# Patient Record
Sex: FEMALE | Race: WHITE | NOT HISPANIC OR LATINO
[De-identification: names, ages, dates, MRNs, and addresses within clinical notes are randomized per-mention and may not be internally consistent; named-entity substitution may affect disease eponyms.]

---

## 2020-03-23 ENCOUNTER — APPOINTMENT (OUTPATIENT)
Dept: CARDIOLOGY | Facility: CLINIC | Age: 70
End: 2020-03-23

## 2020-08-23 ENCOUNTER — EMERGENCY (EMERGENCY)
Facility: HOSPITAL | Age: 70
LOS: 1 days | Discharge: HOME | End: 2020-08-23
Attending: EMERGENCY MEDICINE
Payer: MEDICARE

## 2020-08-23 VITALS
DIASTOLIC BLOOD PRESSURE: 83 MMHG | WEIGHT: 203.05 LBS | HEIGHT: 64 IN | SYSTOLIC BLOOD PRESSURE: 190 MMHG | TEMPERATURE: 98 F | RESPIRATION RATE: 17 BRPM | HEART RATE: 74 BPM | OXYGEN SATURATION: 94 %

## 2020-08-23 PROCEDURE — 90715 TDAP VACCINE 7 YRS/> IM: CPT

## 2020-08-23 PROCEDURE — 99284 EMERGENCY DEPT VISIT MOD MDM: CPT

## 2020-08-23 PROCEDURE — 73140 X-RAY EXAM OF FINGER(S): CPT

## 2020-08-23 PROCEDURE — 99284 EMERGENCY DEPT VISIT MOD MDM: CPT | Mod: 25

## 2020-08-23 PROCEDURE — 90471 IMMUNIZATION ADMIN: CPT

## 2020-08-23 PROCEDURE — 73140 X-RAY EXAM OF FINGER(S): CPT | Mod: 26,RT

## 2020-08-23 RX ORDER — TETANUS TOXOID, REDUCED DIPHTHERIA TOXOID AND ACELLULAR PERTUSSIS VACCINE, ADSORBED 5; 2.5; 8; 8; 2.5 [IU]/.5ML; [IU]/.5ML; UG/.5ML; UG/.5ML; UG/.5ML
0.5 SUSPENSION INTRAMUSCULAR ONCE
Refills: 0 | Status: COMPLETED | OUTPATIENT
Start: 2020-08-23 | End: 2020-08-23

## 2020-08-23 RX ORDER — CEPHALEXIN 500 MG
20 CAPSULE ORAL
Qty: 120 | Refills: 0
Start: 2020-08-23 | End: 2020-08-25

## 2020-08-23 RX ORDER — CEPHALEXIN 500 MG
10 CAPSULE ORAL
Qty: 400 | Refills: 0
Start: 2020-08-23 | End: 2020-09-01

## 2020-08-23 RX ORDER — CEPHALEXIN 500 MG
10 CAPSULE ORAL
Qty: 60 | Refills: 0
Start: 2020-08-23 | End: 2020-08-25

## 2020-08-23 RX ORDER — CEPHALEXIN 500 MG
500 CAPSULE ORAL ONCE
Refills: 0 | Status: COMPLETED | OUTPATIENT
Start: 2020-08-23 | End: 2020-08-23

## 2020-08-23 RX ADMIN — TETANUS TOXOID, REDUCED DIPHTHERIA TOXOID AND ACELLULAR PERTUSSIS VACCINE, ADSORBED 0.5 MILLILITER(S): 5; 2.5; 8; 8; 2.5 SUSPENSION INTRAMUSCULAR at 18:57

## 2020-08-23 RX ADMIN — Medication 500 MILLIGRAM(S): at 19:54

## 2020-08-23 NOTE — ED PROVIDER NOTE - PATIENT PORTAL LINK FT
You can access the FollowMyHealth Patient Portal offered by [facility] by registering at the following website: [email or web address]. By joining [employer]’s FollowMyHealth portal, you will also be able to view your health information using other applications (apps) compatible with our system.

## 2020-08-23 NOTE — CONSULT NOTE ADULT - SUBJECTIVE AND OBJECTIVE BOX
ORTHO-HAND SERVICE    Pt Name: RICHELLE GO    MRN: [id number]      Patient is a 70y Female presenting to the emergency department with a chief complaint of right 2nd digit distal phalanx avulsion injury. Pt states that she was using a folding chair when he finger got caught in it after she sat down. Pt admits to numbness at the distal tip where injury occurred Pt otherwise has no other complaints.      Patient presents for evaluation of right 2nd digit distal phalanx avulsion injury.      REVIEW OF SYSTEMS    General: Alert, responsive, in NAD    Skin/Breast: No rashes, no pruritis. +right 2nd digit distal phalanx avulsion  	  Ophthalmologic: No visual changes. No redness.   	  ENMT:	No discharge. No swelling.    Respiratory and Thorax: No difficulty breathing. No cough.  	   Cardiovascular: No chest pain. No palpitations.    Gastrointestinal: No abdominal pain. No diarrhea.     Genitourinary: No dysuria. No bleeding.    Musculoskeletal: SEE HPI.    Neurological: No sensory or motor changes.     Psychiatric: No anxiety or depression.    Hematology/Lymphatics: No swelling.    Endocrine: No Hx of diabetes.    ROS is otherwise negative.    PAST MEDICAL & SURGICAL HISTORY:  PAST MEDICAL & SURGICAL HISTORY:  No pertinent past medical history      Allergies: No Known Allergies      Medications: cephalexin Suspension 50 mG/mL 500 milliGRAM(s) Oral Once      FAMILY HISTORY:  : non-contributory    Social History:       Daily Height in cm: 162.56 (23 Aug 2020 18:17)    Daily                     PHYSICAL EXAM:      Appearance: Alert, responsive, in no acute distress.    Right upper extremity: Sensation is grossly intact to light touch of the entire right 2nd digit with the exception of the distal tip where avulsion is. + avulsion injury of the right 2nd distal phalanx just distal to nail bed with complete avulsion of nail. 2+ distal radial pulse. Cap refill < 2 sec. No signs of venous  insufficiency  or stasis. No extremity ulcerations. No cyanosis. + Flexion/extension of left 2nd digit mcp/pip/dip.      Imaging Studies: All imaging reviewed with Dr. Hays    A/P:  Pt is a  70y Female with right 2nd digit distal phalanx avulsion injury with nail completely avulsed.      PLAN d/w Dr. Hays:   * Wound cleansed with betadine/saline solution and dressed with xeroform/gauze  * No further orthopedic surgical intervention necessary at this time  * f/u in the office with Dr. Hays within 1 week  * Do not wet dressing  * Ortho stable for d/c ORTHO-HAND SERVICE    Pt Name: RICHELLE GO    MRN: [id number]      Patient is a 70y Female presenting to the emergency department with a chief complaint of right 2nd digit distal phalanx avulsion injury. Pt states that she was using a folding chair when he finger got caught in it after she sat down. Pt admits to numbness at the distal tip where injury occurred Pt otherwise has no other complaints.      Patient presents for evaluation of right 2nd digit distal phalanx avulsion injury.      REVIEW OF SYSTEMS    General: Alert, responsive, in NAD    Skin/Breast: No rashes, no pruritis. +right 2nd digit distal phalanx avulsion  	  Ophthalmologic: No visual changes. No redness.   	  ENMT:	No discharge. No swelling.    Respiratory and Thorax: No difficulty breathing. No cough.  	   Cardiovascular: No chest pain. No palpitations.    Gastrointestinal: No abdominal pain. No diarrhea.     Genitourinary: No dysuria. No bleeding.    Musculoskeletal: SEE HPI.    Neurological: No sensory or motor changes.     Psychiatric: No anxiety or depression.    Hematology/Lymphatics: No swelling.    Endocrine: No Hx of diabetes.    ROS is otherwise negative.    PAST MEDICAL & SURGICAL HISTORY:  PAST MEDICAL & SURGICAL HISTORY:  No pertinent past medical history      Allergies: No Known Allergies      Medications: cephalexin Suspension 50 mG/mL 500 milliGRAM(s) Oral Once      FAMILY HISTORY:  : non-contributory    Social History:       Daily Height in cm: 162.56 (23 Aug 2020 18:17)    Daily                     PHYSICAL EXAM:      Appearance: Alert, responsive, in no acute distress.    Right upper extremity: Sensation is grossly intact to light touch of the entire right 2nd digit with the exception of the distal tip where avulsion is. + avulsion injury of the right 2nd distal phalanx just distal to nail bed with complete avulsion of nail. 2+ distal radial pulse. Cap refill < 2 sec. No signs of venous  insufficiency  or stasis. No extremity ulcerations. No cyanosis. + Flexion/extension of left 2nd digit mcp/pip/dip.      Imaging Studies: All imaging reviewed with Dr. Hays    A/P:  Pt is a  70y Female with right 2nd digit distal phalanx avulsion injury with nail completely avulsed.      PLAN d/w Dr. Hays:   * Wound cleansed with betadine/saline solution and dressed with xeroform/gauze  * No further orthopedic surgical intervention necessary at this time  * Keflex 500mg 4 times daily x 10 days for d/c  * f/u in the office with Dr. Hays within 1 week  * Do not wet dressing  * Ortho stable for d/c

## 2020-08-23 NOTE — ED PROVIDER NOTE - CARE PROVIDER_API CALL
Michelle Hays)  Orthopedics  [address]  [place] [address]  Phone: [phone]  Fax: [phone]  Follow Up Time: Urgent

## 2020-08-23 NOTE — ED PROVIDER NOTE - OBJECTIVE STATEMENT
71yo female with no PMH presenting with R traumatic partial finger amputation. Patient states her finger got stuck in folding chair and became detached at PIP. Patient placed fingertip on ice and came to ED. Tetanus not up to date. No other injuries.

## 2020-08-23 NOTE — ED PROVIDER NOTE - PHYSICAL EXAMINATION
Gen: NAD, AOx3  Head: NCAT  Lung: no respiratory distress  CV: Normal perfusion  Abd: soft, NTND  MSK: S/p R 2nd fingertip amputation with exposed bone at PIP, No edema, no visible deformities, full range of motion in all 4 extremities  Neuro: No focal neurologic deficits  Skin: s/p R index fingertip amputation  Psych: normal affect

## 2020-08-23 NOTE — ED PROVIDER NOTE - CLINICAL SUMMARY MEDICAL DECISION MAKING FREE TEXT BOX
Pt with partial fingertip amputation with exposed bone- hand surgery eval, update tetanus, reassess. Lazara Bradford DO

## 2020-08-23 NOTE — ED ADULT TRIAGE NOTE - CHIEF COMPLAINT QUOTE
Partial amputation of right index finger secondary to chair collapsing on finger.  amputated part in container on ice.  bleeding controlled.

## 2020-08-23 NOTE — ED PROVIDER NOTE - NSFOLLOWUPINSTRUCTIONS_ED_ALL_ED_FT
1. Follow up with Dr. Hays as soon as possible. Call on Monday for an appointment.  2.  Return to the Emergency Department for worsening, progressive or any other concerning symptoms.   3.  Complete your entire course of antibiotics as prescribed. Do not stop taking antibiotics even if your symptoms improve.   4.  Please take Motrin 600mg by mouth every 6 hours as needed for pain. Please take this medication with food.   5.  Please take tylenol 650 mg every 4 hours as needed for pain. Please do not exceed more than 4,000mg of Tylenol in a day    Traumatic Finger Amputation  A traumatic finger amputation is when a person loses part or all of a finger because of an accident or injury. This condition is a medical emergency. It needs to be treated right away to prevent more damage to the finger and to save the lost part of the finger, if that is possible.  What are the causes?  This condition usually results from an accident that involves:  A car.Power tools.Factory work.Farm or lawn equipment.What are the signs or symptoms?  Symptoms of this condition include:  Bleeding.Pain.Damage to surrounding tissues, such as bones, muscles, tendons, and skin.How is this diagnosed?  This condition is diagnosed with a physical exam. During the exam, your health care provider will determine how severe the injury is and the best way to treat it. X-rays may be done to check for damage to the surrounding bones and tissues.  How is this treated?  This condition is treated by cleaning the wound thoroughly and with medicines for pain. Additional treatment depends on the type of injury that you have and how bad it is:  If only the tip of your finger was removed, treatment may involve placing a protective bandage (dressing) over the wound and cleaning the wound regularly.If the injury is severe, a portion of skin may be taken from another part of the body (graft) and attached to the wound site until the wound heals.If a large portion of the finger was cut off, treatment may include a surgical procedure to reattach the finger (replantation).Follow these instructions at home:  Medicines     Take over-the-counter and prescription medicines only as told by your health care provider.If you were prescribed an antibiotic medicine, use it as told by your health care provider. Do not stop using the antibiotic even if your condition improves.Do not drive or use heavy machinery while taking prescription pain medicine.Wound care     Follow instructions from your health care provider about how to take care of your wound. Make sure you:  Wash your hands with soap and water before you change your dressing. If soap and water are not available, use hand .Change your dressing as told by your health care provider.Leave stitches (sutures), skin glue, or adhesive strips in place. These skin closures may need to stay in place for 2 weeks or longer. If adhesive strip edges start to loosen and curl up, you may trim the loose edges. Do not remove adhesive strips completely unless your health care provider tells you to do that.Check your wound every day for signs of infection. Check for:  Redness, swelling, or pain.Fluid or blood.Warmth.Pus or a bad smell.General instructions     Do exercises to strengthen your finger and hand as directed by your health care provider.Keep your hand raised above the level of your heart when resting.Contact a health care provider if:  Your wound does not seem to be healing well.You have redness, swelling, or pain around your wound.You have fluid or blood coming from your wound.Your wound feels warm to the touch.You have pus or a bad smell coming from your wound.You have a fever.Get help right away if:  You have redness spreading or extending from your wound.Summary  A traumatic finger amputation is when a person loses part or all of a finger because of an accident or injury.This condition is diagnosed with a physical exam. During the exam your health care provider will determine how severe the injury is and the best way to treat it.Follow instructions from your health care provider about how to take care of your wound.This information is not intended to replace advice given to you by your health care provider. Make sure you discuss any questions you have with your health care provider.

## 2020-08-24 ENCOUNTER — APPOINTMENT (OUTPATIENT)
Dept: ORTHOPEDIC SURGERY | Facility: CLINIC | Age: 70
End: 2020-08-24
Payer: MEDICARE

## 2020-08-24 VITALS — TEMPERATURE: 97.6 F

## 2020-08-24 DIAGNOSIS — Z78.9 OTHER SPECIFIED HEALTH STATUS: ICD-10-CM

## 2020-08-24 PROCEDURE — 29130 APPL FINGER SPLINT STATIC: CPT | Mod: F6

## 2020-08-24 PROCEDURE — 99204 OFFICE O/P NEW MOD 45 MIN: CPT | Mod: 25

## 2020-08-27 ENCOUNTER — APPOINTMENT (OUTPATIENT)
Dept: ORTHOPEDIC SURGERY | Facility: CLINIC | Age: 70
End: 2020-08-27
Payer: MEDICARE

## 2020-08-27 VITALS
SYSTOLIC BLOOD PRESSURE: 170 MMHG | HEIGHT: 61 IN | OXYGEN SATURATION: 98 % | DIASTOLIC BLOOD PRESSURE: 79 MMHG | HEART RATE: 62 BPM | WEIGHT: 205 LBS | BODY MASS INDEX: 38.71 KG/M2

## 2020-08-27 PROCEDURE — 29130 APPL FINGER SPLINT STATIC: CPT | Mod: F6

## 2020-08-27 PROCEDURE — 99214 OFFICE O/P EST MOD 30 MIN: CPT | Mod: 25

## 2020-08-30 PROBLEM — Z78.9 CURRENT NON-SMOKER: Status: ACTIVE | Noted: 2020-08-30

## 2020-08-30 PROBLEM — Z78.9 DOES NOT USE ILLICIT DRUGS: Status: ACTIVE | Noted: 2020-08-30

## 2020-08-30 PROBLEM — Z78.9 DENIES ALCOHOL CONSUMPTION: Status: ACTIVE | Noted: 2020-08-30

## 2020-08-31 ENCOUNTER — APPOINTMENT (OUTPATIENT)
Dept: ORTHOPEDIC SURGERY | Facility: CLINIC | Age: 70
End: 2020-08-31
Payer: MEDICARE

## 2020-08-31 PROCEDURE — 99213 OFFICE O/P EST LOW 20 MIN: CPT | Mod: 25

## 2020-08-31 PROCEDURE — 29130 APPL FINGER SPLINT STATIC: CPT | Mod: F6

## 2020-09-03 ENCOUNTER — APPOINTMENT (OUTPATIENT)
Dept: ORTHOPEDIC SURGERY | Facility: CLINIC | Age: 70
End: 2020-09-03
Payer: MEDICARE

## 2020-09-03 PROCEDURE — 99213 OFFICE O/P EST LOW 20 MIN: CPT | Mod: 25

## 2020-09-03 PROCEDURE — 29130 APPL FINGER SPLINT STATIC: CPT | Mod: F6

## 2020-09-03 NOTE — HISTORY OF PRESENT ILLNESS
[Right] : right hand dominant [FreeTextEntry1] : 09/03/2020\par Ms. 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  She is doing well.  Denies fevers or chills, denies increased pain, denies purulent drainage.  She is finishing up her oral abx.\par \par 08/31/2020\par Ms. 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  She tolerated the splint and dressing well.  Denies fevers or chills, denies increased pain, denies purulent drainage.\par \par 08/27/2020\par Ms. 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  She tolerated the splint and dressing well.  Denies fevers or chills, denies increased pain, denies purulent drainage.  She noted some increased swelling in the digit and that the splint became a bit tight.\par \par 08/24/2020\par Ms. RICHELLE GO is a pleasant 70 year old female, RHD, retired high .\par \par She presents to the office with her daughter for evaluation of right index finger injury 8/23/2020.  She was seen at [facility] emergency room where imaging showed distal phalanx fracture.  She was placed in a soft and referred to our office for follow up.\par Denies fevers or chills, denies purulent wound drainage.\par \par She denies prior injury.\par Currently her pain is intermittent, throbbing, without radiation.\par She has paresthesia in the tip of the digit\par She denies night symptoms.\par Symptoms improve with elevation.\par Symptoms worsen with direct pressure.\par \par She is not diabetic.\par She denies history of thyroid disease.\par She denies current tobacco use.\par She denies recreational drug use.\par She does not take any narcotic pain medication. \par

## 2020-09-03 NOTE — PROCEDURE
[FreeTextEntry1] : Wound Care and Finger Splint Application\par \par With patient's verbal consent, the right index finger was cleaned with peroxide to remove dried blood stain.  The skin was dried with gauze, and two Tegaderm dressings were applied to sandwich the digit in between and seal the wound distally.  A stax splint was reapplied to the digit to keep PIP free for flexion.  She tolerated it well.  Splint instruction explained and she expressed understanding.

## 2020-09-03 NOTE — DISCUSSION/SUMMARY
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 (DOI 8/23/2020), doing well\par \par -We discussed in detail the clinical findings\par -We discussed the pathophysiology and natural history of patient's condition\par -I recommended to continue semi occlusive dressing to promote granulation and healing of her injury, and finger splinting, she is NWB\par -We discussed signs of worsening infection and when to go to the hospital emergency room for escalation of care \par -She was advised to keep the UE elevated and use ice intermittently to help decrease swelling \par -She was advised to take over the counter medication for pain as needed if there is no contraindication.\par -I will see her in one week for reevaluation, sooner as needed\par -Patient had the opportunity to ask questions and was in agreement with the plan\par -Over 50% of the time spent with the patient was on counseling the patient on the above diagnosis, treatment plan and prognosis.

## 2020-09-03 NOTE — PHYSICAL EXAM
[de-identified] : GENERAL: Awake, alert, cooperative, answers questions appropriately. No acute distress.  Ambulates independently with a normal gait.\par SKIN: Warm, dry, intact. Color and turgor normal. \par LUNGS: Demonstrates unlabored breathing on room air with no accessory muscle use.\par EXTREMITIES: Warm and well-perfused. \par NEUROLOGICAL: Grossly intact.\par \par FOCUSED UPPER EXTREMITY EXAM:\par \par right index finger dressing removed:\par -distal tip amputation involving full avulsion of the nail, wound clean with blood clot, without any erythema or streaking, no signs of infection; superficial skin white coloration due to moisture; ecchymosis in distal phalanx; mild swelling in finger; normal finger cascade without malrotation\par -mild tenderness to palpation over distal phalanx of the digit\par -no tenderness to palpation over the A1 pulleys\par -almost able to make full fist, free AROM in all fingers except for index with stiffness, pain, and swelling, no scissoring\par -index finger FDS/FDP/extension intact\par -full wrist ROM; full forearm supination/pronation; no ECU subluxation; DRUJ stable and nontender\par -sensation intact in radial, ulnar, and median nerve distributions\par -Brisk capillary refill, digit warm well perfused \par  [de-identified] : No new XRs were taken during today's visit.\par \par Previous XRs of right index finger (3 views) from 8/23/2020 showed traumatic amputation of the tip with tuft fracture.

## 2020-09-10 ENCOUNTER — APPOINTMENT (OUTPATIENT)
Dept: ORTHOPEDIC SURGERY | Facility: CLINIC | Age: 70
End: 2020-09-10
Payer: MEDICARE

## 2020-09-10 VITALS — HEIGHT: 61 IN | BODY MASS INDEX: 38.71 KG/M2 | WEIGHT: 205 LBS

## 2020-09-10 PROCEDURE — 99213 OFFICE O/P EST LOW 20 MIN: CPT

## 2020-09-12 ENCOUNTER — EMERGENCY (EMERGENCY)
Facility: HOSPITAL | Age: 70
LOS: 1 days | Discharge: HOME | End: 2020-09-12
Attending: EMERGENCY MEDICINE
Payer: MEDICARE

## 2020-09-12 VITALS
SYSTOLIC BLOOD PRESSURE: 162 MMHG | RESPIRATION RATE: 16 BRPM | HEIGHT: 64 IN | OXYGEN SATURATION: 98 % | DIASTOLIC BLOOD PRESSURE: 71 MMHG | HEART RATE: 78 BPM | WEIGHT: 199.96 LBS | TEMPERATURE: 99 F

## 2020-09-12 VITALS
DIASTOLIC BLOOD PRESSURE: 72 MMHG | RESPIRATION RATE: 19 BRPM | OXYGEN SATURATION: 99 % | SYSTOLIC BLOOD PRESSURE: 174 MMHG | HEART RATE: 82 BPM

## 2020-09-12 LAB
ALBUMIN SERPL ELPH-MCNC: 4 G/DL — SIGNIFICANT CHANGE UP (ref 3.3–5.2)
ALP SERPL-CCNC: 80 U/L — SIGNIFICANT CHANGE UP (ref 40–120)
ALT FLD-CCNC: 20 U/L — SIGNIFICANT CHANGE UP
ANION GAP SERPL CALC-SCNC: 11 MMOL/L — SIGNIFICANT CHANGE UP (ref 5–17)
APPEARANCE UR: CLEAR — SIGNIFICANT CHANGE UP
AST SERPL-CCNC: 29 U/L — SIGNIFICANT CHANGE UP
BASOPHILS # BLD AUTO: 0.04 K/UL — SIGNIFICANT CHANGE UP (ref 0–0.2)
BASOPHILS NFR BLD AUTO: 0.5 % — SIGNIFICANT CHANGE UP (ref 0–2)
BILIRUB SERPL-MCNC: 0.3 MG/DL — LOW (ref 0.4–2)
BILIRUB UR-MCNC: NEGATIVE — SIGNIFICANT CHANGE UP
BUN SERPL-MCNC: 11 MG/DL — SIGNIFICANT CHANGE UP (ref 8–20)
CALCIUM SERPL-MCNC: 9.1 MG/DL — SIGNIFICANT CHANGE UP (ref 8.6–10.2)
CHLORIDE SERPL-SCNC: 104 MMOL/L — SIGNIFICANT CHANGE UP (ref 98–107)
CO2 SERPL-SCNC: 24 MMOL/L — SIGNIFICANT CHANGE UP (ref 22–29)
COLOR SPEC: YELLOW — SIGNIFICANT CHANGE UP
CREAT SERPL-MCNC: 0.72 MG/DL — SIGNIFICANT CHANGE UP (ref 0.5–1.3)
DIFF PNL FLD: NEGATIVE — SIGNIFICANT CHANGE UP
EOSINOPHIL # BLD AUTO: 0.09 K/UL — SIGNIFICANT CHANGE UP (ref 0–0.5)
EOSINOPHIL NFR BLD AUTO: 1 % — SIGNIFICANT CHANGE UP (ref 0–6)
GLUCOSE SERPL-MCNC: 97 MG/DL — SIGNIFICANT CHANGE UP (ref 70–99)
GLUCOSE UR QL: NEGATIVE MG/DL — SIGNIFICANT CHANGE UP
HCT VFR BLD CALC: 43.9 % — SIGNIFICANT CHANGE UP (ref 34.5–45)
HGB BLD-MCNC: 14.6 G/DL — SIGNIFICANT CHANGE UP (ref 11.5–15.5)
IMM GRANULOCYTES NFR BLD AUTO: 0.2 % — SIGNIFICANT CHANGE UP (ref 0–1.5)
KETONES UR-MCNC: ABNORMAL
LEUKOCYTE ESTERASE UR-ACNC: NEGATIVE — SIGNIFICANT CHANGE UP
LYMPHOCYTES # BLD AUTO: 2.34 K/UL — SIGNIFICANT CHANGE UP (ref 1–3.3)
LYMPHOCYTES # BLD AUTO: 26.4 % — SIGNIFICANT CHANGE UP (ref 13–44)
MCHC RBC-ENTMCNC: 31.9 PG — SIGNIFICANT CHANGE UP (ref 27–34)
MCHC RBC-ENTMCNC: 33.3 GM/DL — SIGNIFICANT CHANGE UP (ref 32–36)
MCV RBC AUTO: 95.9 FL — SIGNIFICANT CHANGE UP (ref 80–100)
MONOCYTES # BLD AUTO: 0.6 K/UL — SIGNIFICANT CHANGE UP (ref 0–0.9)
MONOCYTES NFR BLD AUTO: 6.8 % — SIGNIFICANT CHANGE UP (ref 2–14)
NEUTROPHILS # BLD AUTO: 5.76 K/UL — SIGNIFICANT CHANGE UP (ref 1.8–7.4)
NEUTROPHILS NFR BLD AUTO: 65.1 % — SIGNIFICANT CHANGE UP (ref 43–77)
NITRITE UR-MCNC: NEGATIVE — SIGNIFICANT CHANGE UP
PH UR: 5 — SIGNIFICANT CHANGE UP (ref 5–8)
PLATELET # BLD AUTO: 291 K/UL — SIGNIFICANT CHANGE UP (ref 150–400)
POTASSIUM SERPL-MCNC: 3.8 MMOL/L — SIGNIFICANT CHANGE UP (ref 3.5–5.3)
POTASSIUM SERPL-SCNC: 3.8 MMOL/L — SIGNIFICANT CHANGE UP (ref 3.5–5.3)
PROT SERPL-MCNC: 7.2 G/DL — SIGNIFICANT CHANGE UP (ref 6.6–8.7)
PROT UR-MCNC: NEGATIVE MG/DL — SIGNIFICANT CHANGE UP
RBC # BLD: 4.58 M/UL — SIGNIFICANT CHANGE UP (ref 3.8–5.2)
RBC # FLD: 12.4 % — SIGNIFICANT CHANGE UP (ref 10.3–14.5)
SODIUM SERPL-SCNC: 139 MMOL/L — SIGNIFICANT CHANGE UP (ref 135–145)
SP GR SPEC: 1.02 — SIGNIFICANT CHANGE UP (ref 1.01–1.02)
T3 SERPL-MCNC: 118 NG/DL — SIGNIFICANT CHANGE UP (ref 80–200)
T4 AB SER-ACNC: 8.2 UG/DL — SIGNIFICANT CHANGE UP (ref 4.5–12)
TROPONIN T SERPL-MCNC: <0.01 NG/ML — SIGNIFICANT CHANGE UP (ref 0–0.06)
TSH SERPL-MCNC: 2.31 UIU/ML — SIGNIFICANT CHANGE UP (ref 0.27–4.2)
UROBILINOGEN FLD QL: NEGATIVE MG/DL — SIGNIFICANT CHANGE UP
WBC # BLD: 8.85 K/UL — SIGNIFICANT CHANGE UP (ref 3.8–10.5)
WBC # FLD AUTO: 8.85 K/UL — SIGNIFICANT CHANGE UP (ref 3.8–10.5)

## 2020-09-12 PROCEDURE — 87086 URINE CULTURE/COLONY COUNT: CPT

## 2020-09-12 PROCEDURE — 36415 COLL VENOUS BLD VENIPUNCTURE: CPT

## 2020-09-12 PROCEDURE — 70450 CT HEAD/BRAIN W/O DYE: CPT

## 2020-09-12 PROCEDURE — 84484 ASSAY OF TROPONIN QUANT: CPT

## 2020-09-12 PROCEDURE — 84480 ASSAY TRIIODOTHYRONINE (T3): CPT

## 2020-09-12 PROCEDURE — 81003 URINALYSIS AUTO W/O SCOPE: CPT

## 2020-09-12 PROCEDURE — 99284 EMERGENCY DEPT VISIT MOD MDM: CPT | Mod: 25

## 2020-09-12 PROCEDURE — 99285 EMERGENCY DEPT VISIT HI MDM: CPT

## 2020-09-12 PROCEDURE — 80053 COMPREHEN METABOLIC PANEL: CPT

## 2020-09-12 PROCEDURE — 85025 COMPLETE CBC W/AUTO DIFF WBC: CPT

## 2020-09-12 PROCEDURE — 71045 X-RAY EXAM CHEST 1 VIEW: CPT | Mod: 26

## 2020-09-12 PROCEDURE — 93005 ELECTROCARDIOGRAM TRACING: CPT

## 2020-09-12 PROCEDURE — 70450 CT HEAD/BRAIN W/O DYE: CPT | Mod: 26

## 2020-09-12 PROCEDURE — 84443 ASSAY THYROID STIM HORMONE: CPT

## 2020-09-12 PROCEDURE — 71045 X-RAY EXAM CHEST 1 VIEW: CPT

## 2020-09-12 PROCEDURE — 93010 ELECTROCARDIOGRAM REPORT: CPT

## 2020-09-12 PROCEDURE — 84436 ASSAY OF TOTAL THYROXINE: CPT

## 2020-09-12 RX ORDER — AMLODIPINE BESYLATE 2.5 MG/1
5 TABLET ORAL ONCE
Refills: 0 | Status: COMPLETED | OUTPATIENT
Start: 2020-09-12 | End: 2020-09-12

## 2020-09-12 RX ORDER — AMLODIPINE BESYLATE 2.5 MG/1
1 TABLET ORAL
Qty: 60 | Refills: 0
Start: 2020-09-12 | End: 2020-10-11

## 2020-09-12 RX ADMIN — AMLODIPINE BESYLATE 5 MILLIGRAM(S): 2.5 TABLET ORAL at 20:38

## 2020-09-12 NOTE — ED PROVIDER NOTE - ATTENDING CONTRIBUTION TO CARE
I, Abhinav Junior, have personally performed the HPI, physical exam and medical decision making and was personally present and verified all student documentation or findings including history, physical exam, and medical decision making except as noted.    71 yo F no pmh but hasn't followed up with a PMD in "years", recent right 2nd digit amputation on keflex, p/w intermittent weakness. Patient report after getting out of the bathroom and felt a "disconnect" of her body and as if she was "walking in water". The episode lasted about 5 min. She had another reoccurent episode today. No syncope. no chest pain, no sob. She was found to have HTN but not on any medication.     VITAL SIGNS: I have reviewed nursing notes and confirm.  CONSTITUTIONAL: Well-developed; well-nourished; in no acute distress.  SKIN: Skin exam is warm and dry, no acute rash.  HEAD: Normocephalic; atraumatic.  EYES: PERRL, EOM intact; conjunctiva and sclera clear.  ENT: No nasal discharge; airway clear. Throat clear.  NECK: Supple; non tender.    CARD: S1, S2 normal; Regular rate and rhythm.  RESP: No wheezes,  no rales or rhonchi.   ABD:  soft; non-distended; non-tender;   EXT: Normal ROM. No clubbing, cyanosis or edema.  NEURO: Alert, oriented. Grossly unremarkable. No focal deficits. no facial droop, moves all extremities, finger to nose wnl. no pronator drift.  normal gait   PSYCH: Cooperative, appropriate.      CT head negative. blood work wnl. trop negative. cxr clear. ekg without ischemic changes.   Patient offered Obs for MRI. low suspicion for TIA. patient prefer going home with out patient follow up.

## 2020-09-12 NOTE — ED PROVIDER NOTE - PATIENT PORTAL LINK FT
You can access the FollowMyHealth Patient Portal offered by [facility] by registering at the following website: [email or web address]. By joining [employer]’s FollowMyHealth portal, you will also be able to view your health information using other applications (apps) compatible with our system.

## 2020-09-12 NOTE — ED PROVIDER NOTE - CARE PROVIDER_API CALL
Yemi Perez  CARDIOVASCULAR DISEASE  [address], [place] [address]  Phone: [phone]  Fax: [phone]  Follow Up Time: 7-10 Days

## 2020-09-12 NOTE — ED ADULT TRIAGE NOTE - CHIEF COMPLAINT QUOTE
Pt with R 2nd digit avulsion on August 23rd and just finished her abx. Pt states she has been feeling anxious about it since. Wednesday night pt states she had an episode where she states "it felt like I was moving my legs underwater". Pt then states it subsided and she again had another episode of anxiety on Thursday and Friday experiencing similar symptoms. Pt now states today around 15:45 pt states "my legs felt rubbery and I had a weird full body sensation". Pt with no focal deficits and is noted to have clear speech and able to follow commands.

## 2020-09-12 NOTE — ED PROVIDER NOTE - CLINICAL SUMMARY MEDICAL DECISION MAKING FREE TEXT BOX
70F with no diagnosed med conditions b/c does not see doctors p/w intermittent LE weakness x 3 days. Recent 10 days of Keflex for finger amputation 3 weeks ago. Neuro exam unremarkable. Concern for TIA - will get head CT. Also concern for UTI vs electrolyte imbalance vs hypothyroidism vs DM - will get basic labs, TSH, UA.

## 2020-09-12 NOTE — ED PROVIDER NOTE - OBJECTIVE STATEMENT
70F with no known pmhx p/w intermittent weakness x 3 days. Pt states that on 9/9 she went to the bathroom and then was getting into bed when she felt like both of her "legs were  from her body", difficulty walking/imbalance. Another episode today, also reports anxiety. No falls or syncope. Denies CP, SOB, HA, blurry vision, n/v, d/c, f/c. Pt was here on 8/23 for R 2nd finger partial amputation, reattached by ortho, pt finished 10-day course of Keflex 500mg 4x/day - she was asx on abx. Pt does not see doctors or take meds because her father was overprescribed meds.

## 2020-09-12 NOTE — ED PROVIDER NOTE - PROGRESS NOTE DETAILS
: blood work reviewed with patient and daughter. Patient report feeling scared. Patient lives with  who can watch her. Patient ambulated in the ED without any difficulty. BP remain high however asymstomatic. no chest pain. no sob. I offered Obs for MRI. after long discussion, patient would rather go home and will find a PMD for follow up. Will discharge with Norvasc for BP. Instructed to measure BP at home.

## 2020-09-13 LAB
CULTURE RESULTS: SIGNIFICANT CHANGE UP
SPECIMEN SOURCE: SIGNIFICANT CHANGE UP

## 2020-09-16 ENCOUNTER — APPOINTMENT (OUTPATIENT)
Dept: ORTHOPEDIC SURGERY | Facility: CLINIC | Age: 70
End: 2020-09-16
Payer: MEDICARE

## 2020-09-16 VITALS
BODY MASS INDEX: 38.71 KG/M2 | DIASTOLIC BLOOD PRESSURE: 77 MMHG | SYSTOLIC BLOOD PRESSURE: 144 MMHG | HEART RATE: 63 BPM | HEIGHT: 61 IN | WEIGHT: 205 LBS

## 2020-09-16 PROBLEM — I10 ESSENTIAL (PRIMARY) HYPERTENSION: Chronic | Status: ACTIVE | Noted: 2020-09-12

## 2020-09-16 PROCEDURE — 99213 OFFICE O/P EST LOW 20 MIN: CPT

## 2020-09-17 ENCOUNTER — EMERGENCY (EMERGENCY)
Facility: HOSPITAL | Age: 70
LOS: 1 days | Discharge: HOME | End: 2020-09-17
Attending: EMERGENCY MEDICINE
Payer: MEDICARE

## 2020-09-17 VITALS
DIASTOLIC BLOOD PRESSURE: 68 MMHG | SYSTOLIC BLOOD PRESSURE: 154 MMHG | HEART RATE: 69 BPM | RESPIRATION RATE: 18 BRPM | OXYGEN SATURATION: 96 %

## 2020-09-17 VITALS
DIASTOLIC BLOOD PRESSURE: 82 MMHG | RESPIRATION RATE: 18 BRPM | OXYGEN SATURATION: 95 % | TEMPERATURE: 98 F | SYSTOLIC BLOOD PRESSURE: 155 MMHG | HEART RATE: 65 BPM

## 2020-09-17 LAB
BACTERIA # UR AUTO: NEGATIVE — SIGNIFICANT CHANGE UP
EPI CELLS # UR: SIGNIFICANT CHANGE UP
RBC CASTS # UR COMP ASSIST: SIGNIFICANT CHANGE UP /HPF (ref 0–4)
SARS-COV-2 RNA SPEC QL NAA+PROBE: SIGNIFICANT CHANGE UP
WBC UR QL: SIGNIFICANT CHANGE UP

## 2020-09-17 PROCEDURE — 70551 MRI BRAIN STEM W/O DYE: CPT

## 2020-09-17 PROCEDURE — 36415 COLL VENOUS BLD VENIPUNCTURE: CPT

## 2020-09-17 PROCEDURE — 99284 EMERGENCY DEPT VISIT MOD MDM: CPT | Mod: 25

## 2020-09-17 PROCEDURE — 93880 EXTRACRANIAL BILAT STUDY: CPT | Mod: 26

## 2020-09-17 PROCEDURE — 70450 CT HEAD/BRAIN W/O DYE: CPT | Mod: 26

## 2020-09-17 PROCEDURE — 85025 COMPLETE CBC W/AUTO DIFF WBC: CPT

## 2020-09-17 PROCEDURE — 85730 THROMBOPLASTIN TIME PARTIAL: CPT

## 2020-09-17 PROCEDURE — 70450 CT HEAD/BRAIN W/O DYE: CPT

## 2020-09-17 PROCEDURE — 87086 URINE CULTURE/COLONY COUNT: CPT

## 2020-09-17 PROCEDURE — G0378: CPT

## 2020-09-17 PROCEDURE — 80053 COMPREHEN METABOLIC PANEL: CPT

## 2020-09-17 PROCEDURE — 93880 EXTRACRANIAL BILAT STUDY: CPT

## 2020-09-17 PROCEDURE — 85610 PROTHROMBIN TIME: CPT

## 2020-09-17 PROCEDURE — 70551 MRI BRAIN STEM W/O DYE: CPT | Mod: 26

## 2020-09-17 PROCEDURE — 99218: CPT

## 2020-09-17 PROCEDURE — 81001 URINALYSIS AUTO W/SCOPE: CPT

## 2020-09-17 PROCEDURE — U0003: CPT

## 2020-09-17 PROCEDURE — 84484 ASSAY OF TROPONIN QUANT: CPT

## 2020-09-17 RX ORDER — AMLODIPINE BESYLATE 2.5 MG/1
5 TABLET ORAL DAILY
Refills: 0 | Status: DISCONTINUED | OUTPATIENT
Start: 2020-09-17 | End: 2020-09-17

## 2020-09-17 RX ORDER — AMLODIPINE BESYLATE 2.5 MG/1
5 TABLET ORAL
Refills: 0 | Status: DISCONTINUED | OUTPATIENT
Start: 2020-09-17 | End: 2020-09-22

## 2020-09-17 RX ORDER — AMLODIPINE BESYLATE 2.5 MG/1
5 TABLET ORAL AT BEDTIME
Refills: 0 | Status: DISCONTINUED | OUTPATIENT
Start: 2020-09-17 | End: 2020-09-17

## 2020-09-17 NOTE — ED PROVIDER NOTE - ATTENDING CONTRIBUTION TO CARE
I personally saw the patient with the resident, and completed the key components of the history and physical exam. I then discussed the management plan with the resident.      71 yo female pmh htn , multiple visits to ed  comes to ed with intermittent numbness in left arm numbness x 8 days; last episode occurred yesterday lasting 3 minutes;  denies headache, nausea or vomiting;  pe awake alert in nad heent ncat neck supple cor s1 s2 lung clear abd soft nontender neuro aao   x3 ; 5/5 motor ; sensation intact;   dx paresthesia;  work up stroke ;

## 2020-09-17 NOTE — ED PROVIDER NOTE - OBJECTIVE STATEMENT
Pt is a 70 y.o. F presenting with numbness yesterday. The pt has had intermittent numbness since 8 days ago, on 09/09, 09/12, and yesterday night. Yesterday night the pt had numbness of the entire LUE for three minutes. No weakness, headache, fever, sweats, chills, chest pain, shortness of breath, nausea, vomiting, dysuria, or frequency.

## 2020-09-17 NOTE — ED PROVIDER NOTE - PROGRESS NOTE DETAILS
Anastasiya Villalobos, resident: explained findings to patient, pt to be placed in observation for pending MRI and carotid duplex.

## 2020-09-17 NOTE — ED ADULT NURSE REASSESSMENT NOTE - GENERAL PATIENT STATE
family/SO at bedside/comfortable appearance/resting/sleeping/cooperative
comfortable appearance/cooperative

## 2020-09-17 NOTE — ED CDU PROVIDER INITIAL DAY NOTE - PROGRESS NOTE DETAILS
Pt received from Dr. Russell and resident Dr. Villalobos. Pt evaluated at bedside, Pt is 70 F with HTN presenting with episodes of intermittent numbness. Pt also s/p partial finger amputation in August 2020 on right 2nd digit, followed up with Dr. Hays yesterday and reports wound is healing well. Pt refusing to allow me to examine finger or take dressing down.   PE: AOx3, well appearing, non toxic, MMM. PERRL. Neck supple. +S1/S2, peripheral pulse 2+ b/l. Lungs CTA b/l. Abd soft NTND no rebound/guarding no CVAT. Moves all extremities spontaneously/symmetrically. Right 2nd digit in large dressing. Sensation intact. Ambulates steady gait no drift. No dysmetria. CN II-XII grossly intact. Strength 5/5.   Will continue with observation monitoring and plan.

## 2020-09-17 NOTE — ED CDU PROVIDER DISPOSITION NOTE - ATTENDING CONTRIBUTION TO CARE
Trena: I performed a face to face bedside interview with patient regarding history of present illness, review of symptoms and past medical history. I completed an independent physical exam.  I have discussed patient's plan of care with advanced care provider.   I agree with note as stated above including HISTORY OF PRESENT ILLNESS, HIV, PAST MEDICAL/SURGICAL/FAMILY/SOCIAL HISTORY, ALLERGIES AND HOME MEDICATIONS, REVIEW OF SYSTEMS, PHYSICAL EXAM, MEDICAL DECISION MAKING and any PROGRESS NOTES during the time I functioned as the attending physician for this patient  unless otherwise noted. My brief assessment is as follows: Patient with intermittent numbness x 8 days. Last episode yesterday. Placed in CDU for MRI. MRI negative, cleared for discharge.

## 2020-09-17 NOTE — ED CDU PROVIDER DISPOSITION NOTE - CLINICAL COURSE
70 y.o. F presenting with numbness yesterday. The pt has had intermittent numbness since 8 days ago, on 09/09, 09/12, and yesterday night. Yesterday night the pt had numbness of the entire LUE for three minutes. ED workup unremarkable. Pt put into obs for MR head. MR head negative for cva, no acute findings. Pt to be discharged with outpt neuro f/u. Pt well appearing, neuro intact, NAD. Given copy of all results and return precautions. Stable for c

## 2020-09-17 NOTE — ED CDU PROVIDER DISPOSITION NOTE - PATIENT PORTAL LINK FT
You can access the FollowMyHealth Patient Portal offered by [facility] by registering at the following website: [email or web address]. By joining [facility]’s FollowMyHealth portal, you will also be able to view your health information using other applications (apps) compatible with our system.

## 2020-09-17 NOTE — ED ADULT NURSE NOTE - OBJECTIVE STATEMENT
Pt presents with resolution of symptoms which were sudden onset R arm numbness for 3-4min while she was sitting at home. Pt states for the last 2 weeks has been having intermittent full body numbness and LE's have felt like "running in water". Pt able to maex4 with equal strength and purpose, pt with steady gait and acting appropriately at her baseline, as per  at bedside. Zero focal deficits at time of RN assessment.

## 2020-09-17 NOTE — ED CDU PROVIDER INITIAL DAY NOTE - MEDICAL DECISION MAKING DETAILS
Pt is a 70 y.o. F presnenting with recurrent episodes of numbness, negative CT. Awaiting MRI and carotid U/S, ensure not TIA.

## 2020-09-17 NOTE — ED CDU PROVIDER DISPOSITION NOTE - NSFOLLOWUPINSTRUCTIONS_ED_ALL_ED_FT
- Follow up with your doctor within 2-3 days.   - Return to the ED for any new or worsening symptoms including but not limited to severe headache, fever, slurred speech, weakness, etc  - Follow-up with Neurologist as outpatient within 1-2 weeks for further evaluation

## 2020-09-17 NOTE — ED ADULT NURSE REASSESSMENT NOTE - NS ED NURSE REASSESS COMMENT FT1
Assumed care of the patient at 1715. Patient currently in US testing. Patient to be transferred to observation unit CDU 11 after US testing. Will blayneal.
Care endorsed to Azeem XIONG. Patient in NAD. Resting in comfort. VSS. RN with no further questions.
Received patient from lea RN at 1915.  Pt AxO4, VSS.  Pt denies chest pain/SOB/discomfort at this time.  Right A.C #20g in place, flushing without difficulty.  Pt has bandage on right hand, c/d/i.  Safety measures taken, bed in low position, call bell within reach, side rails up x2.  Pts  at bedside. Plan of care explained.  Pt verbalized understanding.  Will continue to monitor.
Patient transferred back from [facility] at 1845. Patient A&Ox4.  present at the bedside. No neuro deficits noted. NIH 0. Patient denies any numbness/tingling or loss of sensation at this time. Ambulatory with steady gait. Patient pending MRI testing. Dressing noted to Right second finger, per pt due to recent injury. Full ROM+ to all extremities. Patient in understanding of plan of care. Patient with no further questions for the RN. Resting in comfort. Call bell within reach and encouraged to use when assistance needed. Will continue to monitor.

## 2020-09-17 NOTE — ED ADULT NURSE NOTE - CHIEF COMPLAINT QUOTE
pt states she was here 9/12 for HTN, all tests negative, last night right arm had loss of sensation for 3-4 minutes, was sitting, no LOC, was told to come back if happened again, trying to get MRI  A&Ox3, resp wnl, anxious

## 2020-09-17 NOTE — ED CDU PROVIDER INITIAL DAY NOTE - ATTENDING CONTRIBUTION TO CARE
Trena: I performed a face to face bedside interview with patient regarding history of present illness, review of symptoms and past medical history. I completed an independent physical exam.  I have discussed patient's plan of care with advanced care provider.   I agree with note as stated above including HISTORY OF PRESENT ILLNESS, HIV, PAST MEDICAL/SURGICAL/FAMILY/SOCIAL HISTORY, ALLERGIES AND HOME MEDICATIONS, REVIEW OF SYSTEMS, PHYSICAL EXAM, MEDICAL DECISION MAKING and any PROGRESS NOTES during the time I functioned as the attending physician for this patient  unless otherwise noted. My brief assessment is as follows: Patient with intermittent numbness x 8 days. Last episode yesterday. CTH negative. Placed in CDU for MRI.

## 2020-09-17 NOTE — ED PROVIDER NOTE - PHYSICAL EXAMINATION
General: well appearing, NAD  Head:  NC, AT  Eyes: EOMI, PERRLA, no scleral icterus  Ears: no erythema/drainage  Nose: midline, no bleeding/drainage  Throat: MMM  Cardiac: RRR, no m/r/g, no lower extremity edema  Respiratory: CTABL, no wheezes/rales/rhonchi, equal chest wall expansions  Abdomen: soft, ND, NT, no rebound tenderness, no guarding, nonperitonitic  MSK/Vascular: full ROM, distal pulses intact, soft compartments, warm extremities  Neuro: AAOx3, negative pronator drift, strength 5/5, sensation to light touch intact, finger to nose coordination intact, cranial nerves 2-12 intact, gait stable  Psych: calm, cooperative, normal affect

## 2020-09-17 NOTE — ED PROVIDER NOTE - NS ED ROS FT
Constitutional: no fever, sweats, and no chills.  Eyes: no pain, no redness, and no visual changes.  ENMT: no ear pain and no hearing problems, no nasal congestion/drainage, no dysphagia, and no throat pain, no neck pain, no stiffness  CV: no chest pain, no edema.  Resp: no cough, no dyspnea  GI: no abdominal pain, no bloating, no constipation, no diarrhea, no nausea and no vomiting.  : no dysuria, no hematuria  MSK: no msk pain, no weakness  Skin: no lesions, and no rashes.  Neuro: no LOC, no headache, and no weakness.

## 2020-09-17 NOTE — ED CDU PROVIDER DISPOSITION NOTE - CARE PROVIDER_API CALL
Mohit Justice  NEUROLOGY  [address] [place] [address]  Phone: [phone]  Fax: [phone]  Follow Up Time:

## 2020-09-17 NOTE — ED ADULT NURSE REASSESSMENT NOTE - COMFORT CARE
po fluids offered/plan of care explained/side rails up
meal provided/repositioned/ambulated to bathroom/plan of care explained/po fluids offered/wait time explained

## 2020-09-18 LAB
CULTURE RESULTS: SIGNIFICANT CHANGE UP
SPECIMEN SOURCE: SIGNIFICANT CHANGE UP

## 2020-09-24 ENCOUNTER — APPOINTMENT (OUTPATIENT)
Dept: ORTHOPEDIC SURGERY | Facility: CLINIC | Age: 70
End: 2020-09-24
Payer: MEDICARE

## 2020-09-24 VITALS
SYSTOLIC BLOOD PRESSURE: 150 MMHG | HEART RATE: 64 BPM | HEIGHT: 61 IN | WEIGHT: 195 LBS | BODY MASS INDEX: 36.82 KG/M2 | DIASTOLIC BLOOD PRESSURE: 84 MMHG

## 2020-09-24 PROCEDURE — 99213 OFFICE O/P EST LOW 20 MIN: CPT

## 2020-09-24 NOTE — DISCUSSION/SUMMARY
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  We discussed that she should perform a dressing change on her own prior to next visit.\par -Patient and family had the opportunity to ask questions and were in agreement with the plan\par -Over 50% of the time spent with the patient was on counseling the patient on the above diagnosis, treatment plan and prognosis.

## 2020-09-24 NOTE — REVIEW OF SYSTEMS
[Right] : right [Joint Pain] : joint pain [Joint Stiffness] : joint stiffness [Joint Swelling] : joint swelling [Negative] : Allergic/Immunologic [Fever] : no fever [Discharge] : discharge [Cough] : no cough [FreeTextEntry9] : right index finger (DOI 8/23/2020).

## 2020-09-24 NOTE — PHYSICAL EXAM
[de-identified] : GENERAL: Awake, alert, cooperative, answers questions appropriately. No acute distress.  Ambulates independently with a normal gait.\par SKIN: Warm, dry, intact. Color and turgor normal. \par LUNGS: Demonstrates unlabored breathing on room air with no accessory muscle use.\par EXTREMITIES: Warm and well-perfused. \par NEUROLOGICAL: Grossly intact.\par \par FOCUSED UPPER EXTREMITY EXAM:\par \par right index finger dressing removed:\par -distal tip amputation involving full avulsion of the nail, wound clean with granulation tissue formation that appeared to have covered the tip of the exposed bone, without any erythema or streaking, no signs of infection; superficial skin white coloration due to moisture; no ecchymosis in distal phalanx; minimal swelling in finger; normal finger cascade without malrotation\par -minimal tenderness to palpation over distal phalanx of the digit\par -no tenderness to palpation over the A1 pulleys\par -almost able to make full fist, free AROM in all fingers except for index with stiffness, pain, and swelling, no scissoring\par -index finger FDS/FDP/extension intact\par -full wrist ROM; full forearm supination/pronation; no ECU subluxation; DRUJ stable and nontender\par -sensation intact in radial, ulnar, and median nerve distributions\par -Brisk capillary refill, digit warm well perfused \par  [de-identified] : No new XRs were taken during today's visit.\par \par Previous XRs of right index finger (3 views) from 8/23/2020 showed traumatic amputation of the tip with tuft fracture.

## 2020-09-24 NOTE — HISTORY OF PRESENT ILLNESS
[Right] : right hand dominant [FreeTextEntry1] : \par 09/24/2020 \par Ms. 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  She is doing well.  Denies fevers or chills, denies increased pain, denies purulent drainage.\par \par 09/16/2020\par Ms. 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  She reports feeling better with her finger.  Denies fevers or chills, denies increased pain, denies purulent drainage.\par \par 09/10/2020\par Ms. 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  She is doing well.  Denies fevers or chills, denies increased pain, denies purulent drainage.  She changed the dressing at home one time prior to her visit today.\par \par 09/03/2020\par Ms. 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  She is doing well.  Denies fevers or chills, denies increased pain, denies purulent drainage.  She is finishing up her oral abx.\par \par 08/31/2020\par Ms. 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  She tolerated the splint and dressing well.  Denies fevers or chills, denies increased pain, denies purulent drainage.\par \par 08/27/2020\par Ms. 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  She tolerated the splint and dressing well.  Denies fevers or chills, denies increased pain, denies purulent drainage.  She noted some increased swelling in the digit and that the splint became a bit tight.\par \par 08/24/2020\par Ms. RICHELLE GO is a pleasant 70 year old female, RHD, retired high .\par \par She presents to the office with her daughter for evaluation of right index finger injury 8/23/2020.  She was seen at [facility] emergency room where imaging showed distal phalanx fracture.  She was placed in a soft and referred to our office for follow up.\par Denies fevers or chills, denies purulent wound drainage.\par \par She denies prior injury.\par Currently her pain is intermittent, throbbing, without radiation.\par She has paresthesia in the tip of the digit\par She denies night symptoms.\par Symptoms improve with elevation.\par Symptoms worsen with direct pressure.\par \par She is not diabetic.\par She denies history of thyroid disease.\par She denies current tobacco use.\par She denies recreational drug use.\par She does not take any narcotic pain medication. \par

## 2020-09-24 NOTE — PROCEDURE
[FreeTextEntry1] : Wound Care\par \par With patient's verbal consent, the right index finger was cleaned with peroxide and rinsed with sterile saline to remove dried blood stain.  The skin was dried with gauze, and two Tegaderm dressings were applied to sandwich the digit in between and seal the wound distally.  Cling was used to wrap the distal aspect of the digit.  She tolerated it well.  Wound care explained and she expressed understanding.

## 2020-09-28 ENCOUNTER — APPOINTMENT (OUTPATIENT)
Dept: FAMILY MEDICINE | Facility: CLINIC | Age: 70
End: 2020-09-28
Payer: MEDICARE

## 2020-09-28 ENCOUNTER — LABORATORY RESULT (OUTPATIENT)
Age: 70
End: 2020-09-28

## 2020-09-28 VITALS — DIASTOLIC BLOOD PRESSURE: 78 MMHG | SYSTOLIC BLOOD PRESSURE: 144 MMHG

## 2020-09-28 VITALS
WEIGHT: 192 LBS | HEIGHT: 61 IN | BODY MASS INDEX: 36.25 KG/M2 | DIASTOLIC BLOOD PRESSURE: 70 MMHG | SYSTOLIC BLOOD PRESSURE: 150 MMHG | HEART RATE: 67 BPM

## 2020-09-28 DIAGNOSIS — Z82.49 FAMILY HISTORY OF ISCHEMIC HEART DISEASE AND OTHER DISEASES OF THE CIRCULATORY SYSTEM: ICD-10-CM

## 2020-09-28 DIAGNOSIS — Z87.891 PERSONAL HISTORY OF NICOTINE DEPENDENCE: ICD-10-CM

## 2020-09-28 DIAGNOSIS — Z78.9 OTHER SPECIFIED HEALTH STATUS: ICD-10-CM

## 2020-09-28 DIAGNOSIS — Z81.8 FAMILY HISTORY OF OTHER MENTAL AND BEHAVIORAL DISORDERS: ICD-10-CM

## 2020-09-28 PROCEDURE — 99204 OFFICE O/P NEW MOD 45 MIN: CPT | Mod: 25

## 2020-09-28 PROCEDURE — 36415 COLL VENOUS BLD VENIPUNCTURE: CPT

## 2020-09-28 RX ORDER — CEPHALEXIN 250 MG/5ML
250 FOR SUSPENSION ORAL
Qty: 400 | Refills: 0 | Status: DISCONTINUED | COMMUNITY
Start: 2020-08-24 | End: 2020-09-28

## 2020-09-30 LAB
25(OH)D3 SERPL-MCNC: 38.8 NG/ML
ANA PAT FLD IF-IMP: ABNORMAL
ANA SER IF-ACNC: ABNORMAL
B BURGDOR IGG+IGM SER QL IB: NORMAL
CHOLEST SERPL-MCNC: 184 MG/DL
CHOLEST/HDLC SERPL: 3.2 RATIO
ERYTHROCYTE [SEDIMENTATION RATE] IN BLOOD BY WESTERGREN METHOD: 21 MM/HR
ESTIMATED AVERAGE GLUCOSE: 117 MG/DL
HBA1C MFR BLD HPLC: 5.7 %
HDLC SERPL-MCNC: 59 MG/DL
LDLC SERPL CALC-MCNC: 100 MG/DL
TRIGL SERPL-MCNC: 129 MG/DL
VIT B12 SERPL-MCNC: 429 PG/ML

## 2020-09-30 NOTE — PHYSICAL EXAM
[No Acute Distress] : no acute distress [Well Nourished] : well nourished [Well-Appearing] : well-appearing [Normal Sclera/Conjunctiva] : normal sclera/conjunctiva [PERRL] : pupils equal round and reactive to light [No Lymphadenopathy] : no lymphadenopathy [Supple] : supple [No Respiratory Distress] : no respiratory distress  [No Accessory Muscle Use] : no accessory muscle use [Clear to Auscultation] : lungs were clear to auscultation bilaterally [Normal Rate] : normal rate  [Regular Rhythm] : with a regular rhythm [Normal S1, S2] : normal S1 and S2 [No Joint Swelling] : no joint swelling [Grossly Normal Strength/Tone] : grossly normal strength/tone [Coordination Grossly Intact] : coordination grossly intact [No Focal Deficits] : no focal deficits [Normal Gait] : normal gait [Deep Tendon Reflexes (DTR)] : deep tendon reflexes were 2+ and symmetric [Speech Grossly Normal] : speech grossly normal [Normal Affect] : the affect was normal [Normal Mood] : the mood was normal

## 2020-09-30 NOTE — HISTORY OF PRESENT ILLNESS
[FreeTextEntry1] : establish care [de-identified] : MS. GO IS A PLEASANT 69 YO PRESENTING TO ESTABLISH CARE WITH HER DAUGHTER TODAY.  HAS NOT SEEN A PMD IN YEARS.  NOTES THAT SHE HAS AN UPCOMING APPOINTMENT WITH NEUROLOGY ON OCTOBER 7, 2020 - DR. GARCIA.  WAS MADE AFTER REFERRED BY ER.  ON 9/9 AND 9/11 DEVELOPED WEAKNESS AND NUMBNESS IN LEGS.  SHE WENT TO S.SIDE ER ON THE 12TH AND THE 17TH FOR EVALUATION.   EPISODES LAST A COUPLE MINUTES.  SHE ALSO HAD A THIRD EPISODE WHERE SHE DEVELOPED RIGHT ARM NUMBNESS AT REST (16TH).  THIS ALSO LASTED  A COUPLE MINUTES.  HAS HAD NO KNOWN TICK BITES.  WAS ADVISED TO SEE CARDIOLOGY - TO BE SCHEDULED.  WAS PRESCRIBED NORVASC BY ER FOR ELEVATED BLOOD PRESSURE BUT DOES NOT FEEL WELL ON IT AND STOPPED TAKING IT.  WAS GETTING PALPITATIONS.  LAST OCCURRED A FEW DAYS AGO.\par ORTHOPEDICS: DR. VERNON

## 2020-09-30 NOTE — PLAN
[FreeTextEntry1] : TO KEEP UPCOMING APPOINTMENT WITH NEUROLOGY\par ER NOTES REVIEWED INCLUDING IMAGING\par CHECK ADDITIONAL LAB WORK INCLUDING LYME\par WILL START LISINOPRIL FOR HYPERTENSION\par FOLLOW-UP FOR BLOOD PRESSURE CHECK\par CARDIOLOGY FOR SCREENING\par FLU VACCINE WAS RECOMMENDED\par CALL WITH ANY QUESTIONS, CONCERNS OR CHANGES \par FOLLOW-UP FOR CPE AND HCM NEEDS

## 2020-09-30 NOTE — REVIEW OF SYSTEMS
[Palpitations] : palpitations [Negative] : Musculoskeletal [Chest Pain] : no chest pain [Lower Ext Edema] : no lower extremity edema [Headache] : no headache [Dizziness] : no dizziness [Fainting] : no fainting [de-identified] : SEE HPI

## 2020-10-02 ENCOUNTER — TRANSCRIPTION ENCOUNTER (OUTPATIENT)
Age: 70
End: 2020-10-02

## 2020-10-02 LAB — HEMOCCULT STL QL IA: NEGATIVE

## 2020-10-05 ENCOUNTER — APPOINTMENT (OUTPATIENT)
Dept: ORTHOPEDIC SURGERY | Facility: CLINIC | Age: 70
End: 2020-10-05
Payer: MEDICARE

## 2020-10-05 VITALS
HEART RATE: 58 BPM | WEIGHT: 192 LBS | SYSTOLIC BLOOD PRESSURE: 146 MMHG | DIASTOLIC BLOOD PRESSURE: 79 MMHG | HEIGHT: 61 IN | BODY MASS INDEX: 36.25 KG/M2

## 2020-10-05 LAB
B BURGDOR AB SER-IMP: NEGATIVE
B BURGDOR IGM PATRN SER IB-IMP: NEGATIVE
B BURGDOR18KD IGG SER QL IB: NORMAL
B BURGDOR23KD IGG SER QL IB: NORMAL
B BURGDOR23KD IGM SER QL IB: NORMAL
B BURGDOR28KD IGG SER QL IB: NORMAL
B BURGDOR30KD IGG SER QL IB: NORMAL
B BURGDOR31KD IGG SER QL IB: NORMAL
B BURGDOR39KD IGG SER QL IB: NORMAL
B BURGDOR39KD IGM SER QL IB: NORMAL
B BURGDOR41KD IGG SER QL IB: PRESENT
B BURGDOR41KD IGM SER QL IB: NORMAL
B BURGDOR45KD IGG SER QL IB: PRESENT
B BURGDOR58KD IGG SER QL IB: NORMAL
B BURGDOR66KD IGG SER QL IB: PRESENT
B BURGDOR93KD IGG SER QL IB: NORMAL

## 2020-10-05 PROCEDURE — 99213 OFFICE O/P EST LOW 20 MIN: CPT

## 2020-10-13 ENCOUNTER — APPOINTMENT (OUTPATIENT)
Dept: FAMILY MEDICINE | Facility: CLINIC | Age: 70
End: 2020-10-13
Payer: MEDICARE

## 2020-10-13 VITALS
DIASTOLIC BLOOD PRESSURE: 80 MMHG | HEIGHT: 61 IN | BODY MASS INDEX: 35.5 KG/M2 | WEIGHT: 188 LBS | SYSTOLIC BLOOD PRESSURE: 140 MMHG | HEART RATE: 68 BPM

## 2020-10-13 VITALS — DIASTOLIC BLOOD PRESSURE: 72 MMHG | SYSTOLIC BLOOD PRESSURE: 108 MMHG

## 2020-10-13 PROCEDURE — 99213 OFFICE O/P EST LOW 20 MIN: CPT

## 2020-10-15 ENCOUNTER — NON-APPOINTMENT (OUTPATIENT)
Age: 70
End: 2020-10-15

## 2020-10-15 ENCOUNTER — APPOINTMENT (OUTPATIENT)
Dept: CARDIOLOGY | Facility: CLINIC | Age: 70
End: 2020-10-15
Payer: MEDICARE

## 2020-10-15 VITALS
BODY MASS INDEX: 35.5 KG/M2 | HEART RATE: 61 BPM | TEMPERATURE: 97.9 F | HEIGHT: 61 IN | RESPIRATION RATE: 16 BRPM | DIASTOLIC BLOOD PRESSURE: 78 MMHG | WEIGHT: 188 LBS | OXYGEN SATURATION: 96 % | SYSTOLIC BLOOD PRESSURE: 146 MMHG

## 2020-10-15 PROCEDURE — 93000 ELECTROCARDIOGRAM COMPLETE: CPT

## 2020-10-15 PROCEDURE — 99214 OFFICE O/P EST MOD 30 MIN: CPT

## 2020-10-15 NOTE — DISCUSSION/SUMMARY
[FreeTextEntry1] : 1. Check echocardiogram given her multiple neurologic symptoms.\par 2. 2 week event monitor to further rule out atrial fibrillation and atrial flutter given neurologic symptoms and possible TIA.\par 3. Followup the results of her MRI and MRA and with neurology regarding her other symptoms. She may consider a second opinion.\par 4. Continue lisinopril for hypertension.\par 5. Monitor BP at home, keep a log and bring to f/u.\par 6. Follow up here after testing, and will make further recommendations at that time.

## 2020-10-15 NOTE — PHYSICAL EXAM
[General Appearance - In No Acute Distress] : no acute distress [Normal Conjunctiva] : the conjunctiva exhibited no abnormalities [Normal Oral Mucosa] : normal oral mucosa [Abdomen Soft] : soft [Auscultation Breath Sounds / Voice Sounds] : lungs were clear to auscultation bilaterally [Abdomen Tenderness] : non-tender [Abnormal Walk] : normal gait [Nail Clubbing] : no clubbing of the fingernails [Cyanosis, Localized] : no localized cyanosis [Skin Color & Pigmentation] : normal skin color and pigmentation [Oriented To Time, Place, And Person] : oriented to person, place, and time [Affect] : the affect was normal [FreeTextEntry1] : No JVD, no carotid bruits.

## 2020-10-15 NOTE — ASSESSMENT
[FreeTextEntry1] : EKG: Sinus rhythm with no significant ST or T wave changes. Poor R wave progression.\par \par 70-year-old female with a past medical history of hypertension who presents to me following several recent episodes of weakness and numbness through her body. She has been worked up so far with no clear diagnosis. MRI showed no evidence of stroke. Carotid Doppler was normal. She is awaiting the results of MRI and MRA of her brain and an EEG is pending. TIA seems possible although somewhat unlikely. I will workup further from a cardiac standpoint. However I would recommend continued neurologic workup especially given the weakness she is having in the mornings. Also possibly rheumatology workup. Consideration of primary muscle disease or lumbar spine disease should be made. EKG is fairly unremarkable and she is in sinus rhythm at this time.

## 2020-10-15 NOTE — HISTORY OF PRESENT ILLNESS
[FreeTextEntry1] : Patient comes to the office today following several recent episodes have happened. On September 9 she was getting ready for bed and suddenly could not feel her body at all. She did not have issues moving her muscles and she was awake but just could not feel any of her body. She quickly went into her bed and after laying down she felt better. This happened 2 times over the next few days and by September 12 she went to the ER where she was evaluated and sent home after a workup. On September 17 she had  numbness in her right arm although nothing similar to what she felt before and she went back to the ER. She was worked up for possible TIA or stroke which was all negative and she was sent home. She follow up with neurology and is waiting the results of an MRI of the head and neck and planning an EEG. She comes to cardiology for further evaluation. She also reports daily weakness in her muscles primarily in the mornings when she first gets up. As the day goes on this gets better and she feels well by the evening. All of these episodes and symptoms have caused her a lot of anxiety.  Patient denies chest pain, shortness of breath, palpitations, orthopnea, presyncope, syncope.

## 2020-10-17 NOTE — PLAN
[FreeTextEntry1] : BLOOD PRESSURE IMPROVED\par CONTINUE LISINOPRIL AND MONITOR BLOOD PRESSURE\par HAS UPCOMING APPOINTMENT WITH CARDIOLOGY\par COMPLETE WORK-UP WITH NEUROLOGY\par AWARE WHEN TO SEEK EMERGENT CARE\par FOLLOW-UP FOR CPE AND HCM NEEDS\par CALL WITH ANY QUESTIONS, CONCERNS OR CHANGES

## 2020-10-17 NOTE — HISTORY OF PRESENT ILLNESS
[FreeTextEntry1] : f/u htn [de-identified] : MS. GO IS A PLEASANT 71 YO PRESENTING FOR FOLLOW-UP OF HYPERTENSION AND FOR BLOOD PRESSURE CHCK.  HAS BEEN ON LISINOPRIL FOR APPROXIMATELY TWO WEEKS.  TAKING DAILY AS DIRECTED.  HAD MRA FOR NEUROLOGY ([facility]).  IT WAS JUST DONE.  RESULTS PENDING.  THEY RECOMMENDED AN EEG BE PERFORMED.  HAS APPOINTMENT TO SEE CARDIOLOGY DR. ANGUIANO ON WEDNESDAY.  NO NEW COMPLAINTS TODAY.

## 2020-10-20 ENCOUNTER — APPOINTMENT (OUTPATIENT)
Dept: CARDIOLOGY | Facility: CLINIC | Age: 70
End: 2020-10-20
Payer: MEDICARE

## 2020-10-20 PROCEDURE — 93306 TTE W/DOPPLER COMPLETE: CPT

## 2020-10-20 PROCEDURE — 99072 ADDL SUPL MATRL&STAF TM PHE: CPT

## 2020-10-26 ENCOUNTER — APPOINTMENT (OUTPATIENT)
Dept: CARDIOLOGY | Facility: CLINIC | Age: 70
End: 2020-10-26
Payer: MEDICARE

## 2020-10-26 VITALS
TEMPERATURE: 97.2 F | HEIGHT: 61 IN | BODY MASS INDEX: 35.3 KG/M2 | WEIGHT: 187 LBS | DIASTOLIC BLOOD PRESSURE: 79 MMHG | HEART RATE: 63 BPM | RESPIRATION RATE: 16 BRPM | OXYGEN SATURATION: 99 % | SYSTOLIC BLOOD PRESSURE: 144 MMHG

## 2020-10-26 PROCEDURE — 99213 OFFICE O/P EST LOW 20 MIN: CPT

## 2020-10-26 PROCEDURE — 99072 ADDL SUPL MATRL&STAF TM PHE: CPT

## 2020-10-26 NOTE — ASSESSMENT
[FreeTextEntry1] : Echocardiogram October 20, 2020 demonstrated left ventricle normal size and function with ejection fraction of 60-65%. Mild mitral, mild to moderate tricuspid regurgitation noted. Mobile intra-atrial septum noted with no PFO noted.\par \par 70-year-old female with a past medical history of hypertension who presented to me following several recent episodes of weakness and numbness through her body. She has been worked up so far with no clear diagnosis.Neurology workup has been negative so far. Echocardiogram shows a normal EF and only mild valve disease. Intra-atrial septum is mobile with no clear evidence of PFO or true interatrial septal aneurysm. I do recommend bubble study at this time and possibly even consideration of JAN. She would like to avoid invasive studies if possible, and I would agree. She still wearing the event monitor but so far it has shown only sinus rhythm. Blood pressure is elevated here today but she says it has been somewhat labile at home. I asked her to bring a list when she comes back.

## 2020-10-26 NOTE — DISCUSSION/SUMMARY
[FreeTextEntry1] : 1. F/u event monitor to further rule out atrial fibrillation and atrial flutter given neurologic symptoms and possible TIA.\par 2. Both study to rule out PFO given her mobile intra-atrial septum and neurological events.\par 3. I will obtain the results of her neurology workup which apparently was unremarkable.\par 4. Continue lisinopril for hypertension.\par 5. Monitor BP at home, keep a log and bring to f/u.\par 6. Consider rheumatology evaluation.\par 7. Follow up here in one month.

## 2020-10-26 NOTE — HISTORY OF PRESENT ILLNESS
[FreeTextEntry1] : Patient presents back having not had any new episodes. She followed up with neurology and apparently MRI, MRA and EEG were all normal. She is currently wearing her event monitor. She reports no palpitations or other symptoms while wearing the monitor. No dizziness or lightheadedness. No other new symptoms at this time. Patient denies chest pain, shortness of breath, orthopnea, presyncope, syncope.

## 2020-11-02 ENCOUNTER — APPOINTMENT (OUTPATIENT)
Dept: ORTHOPEDIC SURGERY | Facility: CLINIC | Age: 70
End: 2020-11-02
Payer: MEDICARE

## 2020-11-02 VITALS
WEIGHT: 187 LBS | HEART RATE: 80 BPM | HEIGHT: 61 IN | BODY MASS INDEX: 35.3 KG/M2 | SYSTOLIC BLOOD PRESSURE: 130 MMHG | DIASTOLIC BLOOD PRESSURE: 76 MMHG

## 2020-11-02 PROCEDURE — 99213 OFFICE O/P EST LOW 20 MIN: CPT

## 2020-11-02 PROCEDURE — 99072 ADDL SUPL MATRL&STAF TM PHE: CPT

## 2020-11-02 NOTE — DISCUSSION/SUMMARY
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 (DOI 8/23/2020), doing well\par \par -We discussed in detail the clinical findings\par -We discussed the pathophysiology and natural history of patient's condition\par -She can cover the tip of the finger with bacitracin then bandaids but should let it air from time to time\par -I encouraged her to perform active and passive ROM of the digit and massages for desensitization, as she declined my offer of formal therapy\par -She was advised to keep the UE elevated and use ice intermittently to help decrease swelling \par -She was advised to take over the counter medication for pain as needed if there is no contraindication.\par -I will see her in eight weeks for reevaluation, sooner as needed. \par -Patient and family had the opportunity to ask questions and were in agreement with the plan\par -Over 50% of the time spent with the patient was on counseling the patient on the above diagnosis, treatment plan and prognosis.

## 2020-11-02 NOTE — REVIEW OF SYSTEMS
[Right] : right [Joint Pain] : joint pain [Joint Stiffness] : joint stiffness [Joint Swelling] : joint swelling [Fever] : no fever [Discharge] : discharge [Cough] : no cough [Negative] : Allergic/Immunologic [FreeTextEntry9] : right index finger (DOI 8/23/2020).

## 2020-11-02 NOTE — PHYSICAL EXAM
[de-identified] : GENERAL: Awake, alert, cooperative, answers questions appropriately. No acute distress.  Ambulates independently with a normal gait.\par SKIN: Warm, dry, intact. Color and turgor normal. \par LUNGS: Demonstrates unlabored breathing on room air with no accessory muscle use.\par EXTREMITIES: Warm and well-perfused. \par NEUROLOGICAL: Grossly intact.\par \par FOCUSED UPPER EXTREMITY EXAM:\par \par right index finger dressing removed:\par -distal tip amputation, with new nail formation, wound clean, almost dried with very small area of granulation tissue formation at the tip that appeared to have covered the tip of the exposed bone, without any erythema or streaking, no signs of infection; minimal superficial skin white coloration almost resolved now the wound was almost dry; minimal swelling in finger; normal finger cascade without malrotation\par -minimal tenderness to palpation over distal phalanx of the digit\par -no tenderness to palpation over the A1 pulleys\par -almost able to make full fist, free AROM in all fingers except for index with mild stiffness, no scissoring\par -index finger FDS/FDP/extension intact\par -full wrist ROM; full forearm supination/pronation; no ECU subluxation; DRUJ stable and nontender\par -sensation intact in radial, ulnar, and median nerve distributions\par -Brisk capillary refill, digit warm well perfused \par  [de-identified] : No new XRs were taken during today's visit.\par \par Previous XRs of right index finger (3 views) from 8/23/2020 showed traumatic amputation of the tip with tuft fracture.

## 2020-11-02 NOTE — HISTORY OF PRESENT ILLNESS
[Right] : right hand dominant [FreeTextEntry1] : \par 11/02/2020\par Ms. 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  She is doing well.  Denies fevers or chills, denies increased pain, denies purulent drainage.  She still covers the tip of the finger because it's still sensitive.\par \par 10/05/2020\par Ms. 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  She is doing well.  Denies fevers or chills, denies increased pain, denies purulent drainage.  They performed one dressing change at home since last visit and noted minimal fluid collection within the semi-occlusive bandage.\par \par 09/24/2020 \par Ms. 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  She is doing well.  Denies fevers or chills, denies increased pain, denies purulent drainage.\par \par 09/16/2020\par Ms. 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  She reports feeling better with her finger.  Denies fevers or chills, denies increased pain, denies purulent drainage.\par \par 09/10/2020\par Ms. 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  She is doing well.  Denies fevers or chills, denies increased pain, denies purulent drainage.  She changed the dressing at home one time prior to her visit today.\par \par 09/03/2020\par Ms. 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  She is doing well.  Denies fevers or chills, denies increased pain, denies purulent drainage.  She is finishing up her oral abx.\par \par 08/31/2020\par Ms. 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  She tolerated the splint and dressing well.  Denies fevers or chills, denies increased pain, denies purulent drainage.\par \par 08/27/2020\par Ms. 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  She tolerated the splint and dressing well.  Denies fevers or chills, denies increased pain, denies purulent drainage.  She noted some increased swelling in the digit and that the splint became a bit tight.\par \par 08/24/2020\par Ms. RICHELLE GO is a pleasant 70 year old female, RHD, retired high .\par \par She presents to the office with her daughter for evaluation of right index finger injury 8/23/2020.  She was seen at [facility] emergency room where imaging showed distal phalanx fracture.  She was placed in a soft and referred to our office for follow up.\par Denies fevers or chills, denies purulent wound drainage.\par \par She denies prior injury.\par Currently her pain is intermittent, throbbing, without radiation.\par She has paresthesia in the tip of the digit\par She denies night symptoms.\par Symptoms improve with elevation.\par Symptoms worsen with direct pressure.\par \par She is not diabetic.\par She denies history of thyroid disease.\par She denies current tobacco use.\par She denies recreational drug use.\par She does not take any narcotic pain medication. \par

## 2020-11-17 ENCOUNTER — APPOINTMENT (OUTPATIENT)
Dept: CARDIOLOGY | Facility: CLINIC | Age: 70
End: 2020-11-17
Payer: MEDICARE

## 2020-11-17 PROCEDURE — 99072 ADDL SUPL MATRL&STAF TM PHE: CPT

## 2020-11-17 PROCEDURE — 93308 TTE F-UP OR LMTD: CPT

## 2020-11-23 ENCOUNTER — APPOINTMENT (OUTPATIENT)
Dept: CARDIOLOGY | Facility: CLINIC | Age: 70
End: 2020-11-23
Payer: MEDICARE

## 2020-11-23 VITALS
HEART RATE: 62 BPM | OXYGEN SATURATION: 93 % | BODY MASS INDEX: 34.74 KG/M2 | TEMPERATURE: 97.9 F | SYSTOLIC BLOOD PRESSURE: 152 MMHG | DIASTOLIC BLOOD PRESSURE: 79 MMHG | RESPIRATION RATE: 16 BRPM | HEIGHT: 61 IN | WEIGHT: 184 LBS

## 2020-11-23 DIAGNOSIS — M62.81 MUSCLE WEAKNESS (GENERALIZED): ICD-10-CM

## 2020-11-23 DIAGNOSIS — R20.0 ANESTHESIA OF SKIN: ICD-10-CM

## 2020-11-23 PROCEDURE — 99214 OFFICE O/P EST MOD 30 MIN: CPT

## 2020-11-23 PROCEDURE — 93000 ELECTROCARDIOGRAM COMPLETE: CPT

## 2020-11-23 NOTE — ASSESSMENT
[FreeTextEntry1] : Echocardiogram October 20, 2020 demonstrated left ventricle normal size and function with ejection fraction of 60-65%. Mild mitral, mild to moderate tricuspid regurgitation noted. Mobile intra-atrial septum noted with no PFO noted.\par \par Bubble study November 17, 2020 was normal without  any evidence of intracardiac shunt.\par \par 70-year-old female with a past medical history of hypertension who presented to me following episodes of weakness and numbness through her body.  Bubble study shows no evidence of intracardiac shunt.  Event monitor shows no evidence of atrial fibrillation or atrial flutter.  She has had no recurrent episodes.  At this time the diagnosis is unclear but there is no convincing evidence that she ever had a stroke or TIA.  Her blood pressures are reasonably controlled on lisinopril alone and I would not make any changes.  Her lipids are controlled as well and for now there is no overriding indication for statin therapy and I will hold off on that and aspirin therapy given the lack of clear indication.

## 2020-11-23 NOTE — DISCUSSION/SUMMARY
[FreeTextEntry1] : 1.  No additional cardiac testing at this time.\par 2. Continue lisinopril for hypertension.\par 3. Monitor BP at home, keep a log and bring to f/u.\par 4. Patient encouraged to work on diet to lose weight.\par 5. Patient is encouraged to exercise at least 30 minutes a day everyday of the week.\par 6. Follow up here in four months.

## 2020-11-23 NOTE — HISTORY OF PRESENT ILLNESS
[FreeTextEntry1] : Patient presents back to the office today feeling generally well.  She has not had any further episodes of muscle weakness or numbness or any other symptoms.  She is essentially done with neurology with no clear diagnosis but no real evidence of TIA or CVA.  Blood pressures have been in the 110s to 130s over 60s to 70s.  She is not very active but within her normal daily activities she does not report any physical limitations or other symptoms.  Patient denies chest pain, shortness of breath, palpitations, orthopnea, presyncope, syncope.

## 2020-12-01 ENCOUNTER — APPOINTMENT (OUTPATIENT)
Dept: CARDIOLOGY | Facility: CLINIC | Age: 70
End: 2020-12-01

## 2020-12-21 ENCOUNTER — APPOINTMENT (OUTPATIENT)
Dept: NEUROLOGY | Facility: CLINIC | Age: 70
End: 2020-12-21

## 2021-01-07 ENCOUNTER — APPOINTMENT (OUTPATIENT)
Dept: ORTHOPEDIC SURGERY | Facility: CLINIC | Age: 71
End: 2021-01-07
Payer: MEDICARE

## 2021-01-07 VITALS
SYSTOLIC BLOOD PRESSURE: 130 MMHG | DIASTOLIC BLOOD PRESSURE: 76 MMHG | HEIGHT: 61 IN | WEIGHT: 184 LBS | BODY MASS INDEX: 34.74 KG/M2 | HEART RATE: 75 BPM

## 2021-01-07 DIAGNOSIS — S68.119D COMPLETE TRAUMATIC METACARPOPHALANGEAL AMPUTATION OF UNSPECIFIED FINGER, SUBSEQUENT ENCOUNTER: ICD-10-CM

## 2021-01-07 PROCEDURE — 99213 OFFICE O/P EST LOW 20 MIN: CPT

## 2021-01-07 PROCEDURE — 99072 ADDL SUPL MATRL&STAF TM PHE: CPT

## 2021-03-12 ENCOUNTER — APPOINTMENT (OUTPATIENT)
Dept: FAMILY MEDICINE | Facility: CLINIC | Age: 71
End: 2021-03-12
Payer: MEDICARE

## 2021-03-12 VITALS
DIASTOLIC BLOOD PRESSURE: 70 MMHG | WEIGHT: 182 LBS | HEART RATE: 88 BPM | SYSTOLIC BLOOD PRESSURE: 130 MMHG | HEIGHT: 61 IN | BODY MASS INDEX: 34.36 KG/M2

## 2021-03-12 PROCEDURE — 99213 OFFICE O/P EST LOW 20 MIN: CPT

## 2021-03-12 PROCEDURE — 99072 ADDL SUPL MATRL&STAF TM PHE: CPT

## 2021-03-12 RX ORDER — MULTIVITAMIN
TABLET ORAL
Refills: 0 | Status: ACTIVE | COMMUNITY

## 2021-03-12 RX ORDER — DIAZEPAM 10 MG/1
10 TABLET ORAL
Qty: 1 | Refills: 0 | Status: DISCONTINUED | COMMUNITY
Start: 2020-10-07 | End: 2021-03-12

## 2021-03-13 NOTE — PLAN
[FreeTextEntry1] : BLOOD PRESSURE CONTROLLED\par CONTINUE CURRENT MEDICATIONS\par MONITOR LAB WORK INCLUDING HBA1C\par RE-ADVISED RHEUMATOLOGY EVALUATION; WORK-UP OF ABNORMAL LAB WORK REFUSED\par CALL WITH ANY QUESTIONS, CONCERNS OR CHANGES \par FOLLOW-UP FOR CPE

## 2021-03-13 NOTE — PHYSICAL EXAM
[No Acute Distress] : no acute distress [Well Nourished] : well nourished [Well-Appearing] : well-appearing [Normal Sclera/Conjunctiva] : normal sclera/conjunctiva [PERRL] : pupils equal round and reactive to light [No Lymphadenopathy] : no lymphadenopathy [Supple] : supple [No Respiratory Distress] : no respiratory distress  [No Accessory Muscle Use] : no accessory muscle use [Clear to Auscultation] : lungs were clear to auscultation bilaterally [Normal Rate] : normal rate  [Regular Rhythm] : with a regular rhythm [Normal S1, S2] : normal S1 and S2 [Speech Grossly Normal] : speech grossly normal [Memory Grossly Normal] : memory grossly normal [Normal Mood] : the mood was normal

## 2021-03-13 NOTE — HISTORY OF PRESENT ILLNESS
[FreeTextEntry1] : F/U HYPERTENSION [de-identified] : MS. GO IS A PLEASANT 72 YO PRESENTING FOR FOLLOW-UP.  CHRONIC HISTORY OF HYPERTENSION ON LISINOPRIL.  ALSO HISTORY OF IMPAIRED FASTING GLUCOSE.  SHE NEVER SAW RHEUMATOLOGY - REFUSES EVALUATION OF ABNORMAL LAB WORK.  IS FEELING WELL TODAY.   HAS HAD NO HEADACHE, DIZZINESS, CHEST PAIN, SHORTNESS OF BREATH, GI OR URINARY COMPLAINTS.  NO OTHER COMPLAINTS TODAY. 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

## 2021-03-18 ENCOUNTER — APPOINTMENT (OUTPATIENT)
Dept: CARDIOLOGY | Facility: CLINIC | Age: 71
End: 2021-03-18

## 2021-04-28 ENCOUNTER — APPOINTMENT (OUTPATIENT)
Dept: CARDIOLOGY | Facility: CLINIC | Age: 71
End: 2021-04-28
Payer: MEDICARE

## 2021-04-28 ENCOUNTER — NON-APPOINTMENT (OUTPATIENT)
Age: 71
End: 2021-04-28

## 2021-04-28 VITALS
WEIGHT: 176 LBS | DIASTOLIC BLOOD PRESSURE: 82 MMHG | TEMPERATURE: 97.3 F | BODY MASS INDEX: 33.23 KG/M2 | HEIGHT: 61 IN | HEART RATE: 62 BPM | RESPIRATION RATE: 16 BRPM | SYSTOLIC BLOOD PRESSURE: 152 MMHG

## 2021-04-28 PROCEDURE — 99213 OFFICE O/P EST LOW 20 MIN: CPT

## 2021-04-28 PROCEDURE — 99072 ADDL SUPL MATRL&STAF TM PHE: CPT

## 2021-04-28 PROCEDURE — 93000 ELECTROCARDIOGRAM COMPLETE: CPT

## 2021-04-28 NOTE — HISTORY OF PRESENT ILLNESS
[FreeTextEntry1] : Patient presents back today feeling generally well.  She has had no further episodes of the numbness or symptoms that originally brought her to me.  Her blood pressures have been in the 130s over 60s to 70s.  She has lost about 10 pounds but despite that her blood pressure seems to be a bit higher.  She says she is doing some exercising and is able to do that with no physical symptoms or limitations.  Patient denies chest pain, shortness of breath, palpitations, orthopnea, presyncope, syncope.

## 2021-04-28 NOTE — ASSESSMENT
[FreeTextEntry1] : Echocardiogram October 20, 2020 demonstrated left ventricle normal size and function with ejection fraction of 60-65%. Mild mitral, mild to moderate tricuspid regurgitation noted. Mobile intra-atrial septum noted with no PFO noted.\par \par Bubble study November 17, 2020 was normal without  any evidence of intracardiac shunt.\par \par EKG: Sinus rhythm with no significant ST or T wave changes.\par \par 71-year-old female with a past medical history of hypertension who presented to me for follow-up.  Patient is asymptomatic at this time.  Blood pressures are a little bit elevated and in fact higher than the last time I saw her.  She has lost a little bit of weight but still they have gone up.  I encouraged her to consider increasing lisinopril but she feels that she has some issues with her throat that may be related to lisinopril does not want to increase it.  Given the side effects and the blood pressure I recommended trying something different like valsartan, but she is unwilling to make that change either.  She would like to continue on her current medication and continue her work with diet and exercise.  Given the mild elevation in her blood pressure this seems reasonable.  She is overdue for repeat blood work.

## 2021-05-20 LAB
ANION GAP SERPL CALC-SCNC: 11 MMOL/L
BUN SERPL-MCNC: 10 MG/DL
CALCIUM SERPL-MCNC: 9.6 MG/DL
CHLORIDE SERPL-SCNC: 101 MMOL/L
CO2 SERPL-SCNC: 26 MMOL/L
CREAT SERPL-MCNC: 0.83 MG/DL
GLUCOSE SERPL-MCNC: 94 MG/DL
POTASSIUM SERPL-SCNC: 4.3 MMOL/L
SODIUM SERPL-SCNC: 138 MMOL/L

## 2021-05-21 LAB
ESTIMATED AVERAGE GLUCOSE: 120 MG/DL
HBA1C MFR BLD HPLC: 5.8 %

## 2021-05-22 LAB
ANION GAP SERPL CALC-SCNC: 12 MMOL/L
BUN SERPL-MCNC: 10 MG/DL
CALCIUM SERPL-MCNC: 9.5 MG/DL
CHLORIDE SERPL-SCNC: 101 MMOL/L
CHOLEST SERPL-MCNC: 205 MG/DL
CO2 SERPL-SCNC: 25 MMOL/L
CREAT SERPL-MCNC: 0.81 MG/DL
ESTIMATED AVERAGE GLUCOSE: 114 MG/DL
GLUCOSE SERPL-MCNC: 93 MG/DL
HBA1C MFR BLD HPLC: 5.6 %
HDLC SERPL-MCNC: 62 MG/DL
LDLC SERPL CALC-MCNC: 124 MG/DL
MAGNESIUM SERPL-MCNC: 2 MG/DL
NONHDLC SERPL-MCNC: 142 MG/DL
POTASSIUM SERPL-SCNC: 4.3 MMOL/L
SODIUM SERPL-SCNC: 138 MMOL/L
TRIGL SERPL-MCNC: 90 MG/DL

## 2021-05-25 ENCOUNTER — NON-APPOINTMENT (OUTPATIENT)
Age: 71
End: 2021-05-25

## 2021-08-24 ENCOUNTER — APPOINTMENT (OUTPATIENT)
Dept: CARDIOLOGY | Facility: CLINIC | Age: 71
End: 2021-08-24

## 2021-09-20 ENCOUNTER — APPOINTMENT (OUTPATIENT)
Dept: FAMILY MEDICINE | Facility: CLINIC | Age: 71
End: 2021-09-20

## 2021-09-22 ENCOUNTER — APPOINTMENT (OUTPATIENT)
Dept: CARDIOLOGY | Facility: CLINIC | Age: 71
End: 2021-09-22
Payer: MEDICARE

## 2021-09-22 ENCOUNTER — NON-APPOINTMENT (OUTPATIENT)
Age: 71
End: 2021-09-22

## 2021-09-22 VITALS
RESPIRATION RATE: 16 BRPM | SYSTOLIC BLOOD PRESSURE: 130 MMHG | HEART RATE: 56 BPM | OXYGEN SATURATION: 100 % | HEIGHT: 61 IN | BODY MASS INDEX: 32.47 KG/M2 | WEIGHT: 172 LBS | DIASTOLIC BLOOD PRESSURE: 77 MMHG

## 2021-09-22 PROCEDURE — 99214 OFFICE O/P EST MOD 30 MIN: CPT

## 2021-09-22 PROCEDURE — 93000 ELECTROCARDIOGRAM COMPLETE: CPT

## 2021-09-22 NOTE — DISCUSSION/SUMMARY
[FreeTextEntry1] : 1. No additional cardiac testing at this time.\par 2. Continue lisinopril for hypertension. \par 3. Monitor BP at home, keep a log and bring to f/u.\par 4. Patient encouraged to work on diet to lose weight.  Patient encouraged to work on a healthy diet high in lean protein, whole grains and vegetables, and lower in white flour and simple sugars.\par 5. Patient is encouraged to exercise at least 30 minutes a day everyday of the week.\par 6. Follow up here in 6 months.

## 2021-09-22 NOTE — ASSESSMENT
[FreeTextEntry1] : Echocardiogram October 20, 2020 demonstrated left ventricle normal size and function with ejection fraction of 60-65%. Mild mitral, mild to moderate tricuspid regurgitation noted. Mobile intra-atrial septum noted with no PFO noted.\par \par Bubble study November 17, 2020 was normal without  any evidence of intracardiac shunt.\par \par EKG: Sinus rhythm with no significant ST or T wave changes.  Delayed R wave progression.  Voltage criteria for LVH.\par \par 71-year-old female with a past medical history of hypertension who presented to me for follow-up.  Patient is asymptomatic at this time.  Blood pressures now appear to be very well controlled.  Her weight has been fluctuating a little bit but she certainly is to work on losing some more.  She is aware of this and is working on it.  Hopefully she can start doing some more of her exercise and will make some changes to her diet.  Recent blood work shows an LDL that has gone up a little bit but no indication for statin therapy for now.  Her A1c also went up a little bit.  Once again she will be working on diet, exercise and weight loss which should improve both of these issues.

## 2021-09-22 NOTE — HISTORY OF PRESENT ILLNESS
[FreeTextEntry1] : Patient presents back today feeling physically well.  She has been in [place] for a while helping her son move and was doing less exercise at that time.  Now she has been doing some work again although she is retired and has been exercising less because of that.  She still does her walking sometimes and reports no symptoms when she does it.  Blood pressures have been in the 110s to 120s over 60s to 70s.  Patient denies chest pain, shortness of breath, palpitations, orthopnea, presyncope, syncope.

## 2021-09-29 ENCOUNTER — APPOINTMENT (OUTPATIENT)
Dept: FAMILY MEDICINE | Facility: CLINIC | Age: 71
End: 2021-09-29
Payer: MEDICARE

## 2021-09-29 VITALS
HEART RATE: 56 BPM | DIASTOLIC BLOOD PRESSURE: 70 MMHG | WEIGHT: 172 LBS | BODY MASS INDEX: 32.47 KG/M2 | SYSTOLIC BLOOD PRESSURE: 140 MMHG | HEIGHT: 61 IN

## 2021-09-29 DIAGNOSIS — R73.01 IMPAIRED FASTING GLUCOSE: ICD-10-CM

## 2021-09-29 PROCEDURE — 36415 COLL VENOUS BLD VENIPUNCTURE: CPT

## 2021-09-29 PROCEDURE — G0439: CPT

## 2021-09-29 PROCEDURE — 99213 OFFICE O/P EST LOW 20 MIN: CPT | Mod: 25

## 2021-09-29 NOTE — HISTORY OF PRESENT ILLNESS
[FreeTextEntry1] : WELLNESS EXAM\par F/U HYPERTENSION [de-identified] : MS. GO IS A PLEASANT 70 YO FOR YEARLY PHYSICAL.  HAS BEEN FEELING GOOD.  IS DUE FOR MAMMOGRAM.  HAS NO BREAST COMPLAINTS.  NO PAIN, NIPPLE DISCHARGE, SKIN CHANGES OR LUMPS .  HAS NOT SEEN GYN IN YEARS OR GONE FOR COLONOSCOPY OR BONE DENSITY.  IS NOT INTERESTED IN VACCINATIONS.\par \par ALSO HERE FOR CHRONIC MEDICAL CARE MANAGEMENT.  SHE HAS A CHRONIC HISTORY OF HYPERTENSION, HYPERLIPIDEMIA, OBESITY AND PREDIABETES.  REMAINS ON LISINOPRIL FOR BLOOD PRESSURE.  DIET IS "NOT AS BALANCED AS IT SHOULD BE".  IS AVOIDING SWEETS.  SOME WALKING FOR EXERCISE.   HAS HAD NO HEADACHE, DIZZINESS, CHEST PAIN, SHORTNESS OF BREATH, GI OR URINARY COMPLAINTS.  NO OTHER COMPLAINTS TODAY.  SEES CARDIOLOGY ROUTINELY.  RECENT EVALUATION ONE WEEK AGO AND BLOOD PRESSURE WAS CONTROLLED.

## 2021-09-29 NOTE — HEALTH RISK ASSESSMENT
[Very Good] : ~his/her~ current health as very good [Excellent] : ~his/her~  mood as  excellent [No] : In the past 12 months have you used drugs other than those required for medical reasons? No [No falls in past year] : Patient reported no falls in the past year [HIV test declined] : HIV test declined [Hepatitis C test declined] : Hepatitis C test declined [None] : None [With Significant Other] : lives with significant other [Employed] : employed [Retired] : retired [] :  [# Of Children ___] : has [unfilled] children [Sexually Active] : sexually active [Feels Safe at Home] : Feels safe at home [Fully functional (bathing, dressing, toileting, transferring, walking, feeding)] : Fully functional (bathing, dressing, toileting, transferring, walking, feeding) [Fully functional (using the telephone, shopping, preparing meals, housekeeping, doing laundry, using] : Fully functional and needs no help or supervision to perform IADLs (using the telephone, shopping, preparing meals, housekeeping, doing laundry, using transportation, managing medications and managing finances) [Reports normal functional visual acuity (ie: able to read med bottle)] : Reports normal functional visual acuity [Smoke Detector] : smoke detector [Carbon Monoxide Detector] : carbon monoxide detector [Safety elements used in home] : safety elements used in home [Seat Belt] :  uses seat belt [With Patient/Caregiver] : , with patient/caregiver [] : No [de-identified] : walking [de-identified] : "not as balanced as it should be.  avoiding sweets." [Change in mental status noted] : No change in mental status noted [Language] : denies difficulty with language [Learning/Retaining New Information] : denies difficulty learning/retaining new information [Handling Complex Tasks] : denies difficulty handling complex tasks [High Risk Behavior] : no high risk behavior [Reports changes in vision] : Reports no changes in vision [Reports changes in dental health] : Reports no changes in dental health [Sunscreen] : does not use sunscreen [MammogramDate] : years ago [PapSmearDate] : years ago [BoneDensityDate] : years ago [de-identified] : glasses for reading and distance; eye exam yesterday [AdvancecareDate] : 09/21

## 2021-09-29 NOTE — HISTORY OF PRESENT ILLNESS
[FreeTextEntry1] : WELLNESS EXAM\par F/U HYPERTENSION [de-identified] : MS. GO IS A PLEASANT 72 YO FOR YEARLY PHYSICAL.  HAS BEEN FEELING GOOD.  IS DUE FOR MAMMOGRAM.  HAS NO BREAST COMPLAINTS.  NO PAIN, NIPPLE DISCHARGE, SKIN CHANGES OR LUMPS .  HAS NOT SEEN GYN IN YEARS OR GONE FOR COLONOSCOPY OR BONE DENSITY.  IS NOT INTERESTED IN VACCINATIONS.\par \par ALSO HERE FOR CHRONIC MEDICAL CARE MANAGEMENT.  SHE HAS A CHRONIC HISTORY OF HYPERTENSION, HYPERLIPIDEMIA, OBESITY AND PREDIABETES.  REMAINS ON LISINOPRIL FOR BLOOD PRESSURE.  DIET IS "NOT AS BALANCED AS IT SHOULD BE".  IS AVOIDING SWEETS.  SOME WALKING FOR EXERCISE.   HAS HAD NO HEADACHE, DIZZINESS, CHEST PAIN, SHORTNESS OF BREATH, GI OR URINARY COMPLAINTS.  NO OTHER COMPLAINTS TODAY.  SEES CARDIOLOGY ROUTINELY.  RECENT EVALUATION ONE WEEK AGO AND BLOOD PRESSURE WAS CONTROLLED.

## 2021-09-29 NOTE — PLAN
[FreeTextEntry1] : VISION SCREENING\par GYN\par COLONOSCOPY\par RX MAMMOGRAM\par RX BONE DENSITY\par VACCINATIONS DISCUSSED: FLU, COVID, PNEUMONIA VACCINATIONS, SHINGRIX\par ALL VACCINATIONS DECLINED\par \par CARDIOLOGY CONSULTANT NOTE APPRECIATED\par HEALTHY DIET AND LIFESTYLE MODIFICATIONS\par HEALTHY WEIGHT LOSS \par WILL MONITOR BLOOD PRESSURE - WELL CONTROLLED AT RECENT CARDIOLOGY EVALUATION ONE WEEK AGO\par WILL CONTINUE LISINOPRIL 10 MG DAILY\par MONITOR LAB WORK INCLUDING LIPIDS AND HBA1C\par CALL WITH ANY QUESTIONS, CONCERNS OR CHANGES

## 2021-09-29 NOTE — HEALTH RISK ASSESSMENT
[Very Good] : ~his/her~ current health as very good [Excellent] : ~his/her~  mood as  excellent [No] : In the past 12 months have you used drugs other than those required for medical reasons? No [No falls in past year] : Patient reported no falls in the past year [HIV test declined] : HIV test declined [Hepatitis C test declined] : Hepatitis C test declined [None] : None [With Significant Other] : lives with significant other [Employed] : employed [Retired] : retired [] :  [# Of Children ___] : has [unfilled] children [Sexually Active] : sexually active [Feels Safe at Home] : Feels safe at home [Fully functional (bathing, dressing, toileting, transferring, walking, feeding)] : Fully functional (bathing, dressing, toileting, transferring, walking, feeding) [Fully functional (using the telephone, shopping, preparing meals, housekeeping, doing laundry, using] : Fully functional and needs no help or supervision to perform IADLs (using the telephone, shopping, preparing meals, housekeeping, doing laundry, using transportation, managing medications and managing finances) [Reports normal functional visual acuity (ie: able to read med bottle)] : Reports normal functional visual acuity [Smoke Detector] : smoke detector [Carbon Monoxide Detector] : carbon monoxide detector [Safety elements used in home] : safety elements used in home [Seat Belt] :  uses seat belt [With Patient/Caregiver] : , with patient/caregiver [] : No [de-identified] : walking [de-identified] : "not as balanced as it should be.  avoiding sweets." [Change in mental status noted] : No change in mental status noted [Language] : denies difficulty with language [Learning/Retaining New Information] : denies difficulty learning/retaining new information [Handling Complex Tasks] : denies difficulty handling complex tasks [High Risk Behavior] : no high risk behavior [Reports changes in vision] : Reports no changes in vision [Reports changes in dental health] : Reports no changes in dental health [Sunscreen] : does not use sunscreen [MammogramDate] : years ago [PapSmearDate] : years ago [BoneDensityDate] : years ago [de-identified] : glasses for reading and distance; eye exam yesterday [AdvancecareDate] : 09/21

## 2021-09-30 LAB
BASOPHILS # BLD AUTO: 0.04 K/UL
BASOPHILS NFR BLD AUTO: 0.6 %
EOSINOPHIL # BLD AUTO: 0.17 K/UL
EOSINOPHIL NFR BLD AUTO: 2.5 %
HCT VFR BLD CALC: 44.3 %
HGB BLD-MCNC: 13.8 G/DL
IMM GRANULOCYTES NFR BLD AUTO: 0.1 %
LYMPHOCYTES # BLD AUTO: 2.77 K/UL
LYMPHOCYTES NFR BLD AUTO: 40.4 %
MAN DIFF?: NORMAL
MCHC RBC-ENTMCNC: 31.2 GM/DL
MCHC RBC-ENTMCNC: 31.4 PG
MCV RBC AUTO: 100.7 FL
MONOCYTES # BLD AUTO: 0.62 K/UL
MONOCYTES NFR BLD AUTO: 9 %
NEUTROPHILS # BLD AUTO: 3.25 K/UL
NEUTROPHILS NFR BLD AUTO: 47.4 %
PLATELET # BLD AUTO: 288 K/UL
RBC # BLD: 4.4 M/UL
RBC # FLD: 12.7 %
T4 FREE SERPL-MCNC: 1.1 NG/DL
TSH SERPL-ACNC: 1.66 UIU/ML
WBC # FLD AUTO: 6.86 K/UL

## 2021-11-16 LAB — HEMOCCULT STL QL IA: NEGATIVE

## 2022-03-03 ENCOUNTER — LABORATORY RESULT (OUTPATIENT)
Age: 72
End: 2022-03-03

## 2022-03-03 ENCOUNTER — NON-APPOINTMENT (OUTPATIENT)
Age: 72
End: 2022-03-03

## 2022-03-03 ENCOUNTER — APPOINTMENT (OUTPATIENT)
Dept: FAMILY MEDICINE | Facility: CLINIC | Age: 72
End: 2022-03-03
Payer: MEDICARE

## 2022-03-03 VITALS — SYSTOLIC BLOOD PRESSURE: 126 MMHG | DIASTOLIC BLOOD PRESSURE: 70 MMHG

## 2022-03-03 VITALS
SYSTOLIC BLOOD PRESSURE: 140 MMHG | HEIGHT: 61 IN | HEART RATE: 71 BPM | BODY MASS INDEX: 33.04 KG/M2 | DIASTOLIC BLOOD PRESSURE: 70 MMHG | WEIGHT: 175 LBS

## 2022-03-03 DIAGNOSIS — R07.89 OTHER CHEST PAIN: ICD-10-CM

## 2022-03-03 DIAGNOSIS — R53.1 WEAKNESS: ICD-10-CM

## 2022-03-03 DIAGNOSIS — R53.83 OTHER FATIGUE: ICD-10-CM

## 2022-03-03 PROCEDURE — G0433: CPT | Mod: QW

## 2022-03-03 PROCEDURE — 99214 OFFICE O/P EST MOD 30 MIN: CPT | Mod: 25

## 2022-03-03 PROCEDURE — 93000 ELECTROCARDIOGRAM COMPLETE: CPT | Mod: 59

## 2022-03-04 LAB
ALBUMIN SERPL ELPH-MCNC: 4.4 G/DL
ALP BLD-CCNC: 107 U/L
ALT SERPL-CCNC: 16 U/L
ANION GAP SERPL CALC-SCNC: 13 MMOL/L
AST SERPL-CCNC: 23 U/L
BASOPHILS # BLD AUTO: 0.04 K/UL
BASOPHILS NFR BLD AUTO: 0.5 %
BILIRUB SERPL-MCNC: 0.3 MG/DL
BUN SERPL-MCNC: 8 MG/DL
CALCIUM SERPL-MCNC: 9.9 MG/DL
CHLORIDE SERPL-SCNC: 102 MMOL/L
CO2 SERPL-SCNC: 28 MMOL/L
COVID-19 SPIKE DOMAIN ANTIBODY INTERPRETATION: NEGATIVE
CREAT SERPL-MCNC: 0.75 MG/DL
DEPRECATED D DIMER PPP IA-ACNC: <150 NG/ML DDU
EGFR: 83 ML/MIN/1.73M2
EOSINOPHIL # BLD AUTO: 0.21 K/UL
EOSINOPHIL NFR BLD AUTO: 2.8 %
ESTIMATED AVERAGE GLUCOSE: 114 MG/DL
GLUCOSE SERPL-MCNC: 91 MG/DL
HBA1C MFR BLD HPLC: 5.6 %
HCT VFR BLD CALC: 44.3 %
HGB BLD-MCNC: 14.6 G/DL
IMM GRANULOCYTES NFR BLD AUTO: 0.4 %
INFLUENZA A RESULT: NOT DETECTED
INFLUENZA B RESULT: NOT DETECTED
LYMPHOCYTES # BLD AUTO: 2.53 K/UL
LYMPHOCYTES NFR BLD AUTO: 34.2 %
MAN DIFF?: NORMAL
MCHC RBC-ENTMCNC: 31.7 PG
MCHC RBC-ENTMCNC: 33 GM/DL
MCV RBC AUTO: 96.3 FL
MONOCYTES # BLD AUTO: 0.65 K/UL
MONOCYTES NFR BLD AUTO: 8.8 %
NEUTROPHILS # BLD AUTO: 3.93 K/UL
NEUTROPHILS NFR BLD AUTO: 53.3 %
PLATELET # BLD AUTO: 326 K/UL
POTASSIUM SERPL-SCNC: 4.4 MMOL/L
PROT SERPL-MCNC: 7.5 G/DL
RBC # BLD: 4.6 M/UL
RBC # FLD: 12.7 %
RESP SYN VIRUS RESULT: NOT DETECTED
SARS-COV-2 AB SERPL IA-ACNC: 0.4 U/ML
SARS-COV-2 RESULT: NOT DETECTED
SODIUM SERPL-SCNC: 143 MMOL/L
T4 FREE SERPL-MCNC: 1.1 NG/DL
TSH SERPL-ACNC: 1.71 UIU/ML
VIT B12 SERPL-MCNC: 553 PG/ML
WBC # FLD AUTO: 7.39 K/UL

## 2022-03-05 PROBLEM — R07.89 CHEST PRESSURE: Status: ACTIVE | Noted: 2022-03-03

## 2022-03-05 PROBLEM — R53.83 FATIGUE: Status: ACTIVE | Noted: 2022-03-03

## 2022-03-05 PROBLEM — R53.1 WEAKNESS: Status: ACTIVE | Noted: 2022-03-03

## 2022-03-05 NOTE — HISTORY OF PRESENT ILLNESS
[FreeTextEntry8] : MS. GO IS A PLEASANT 73 YO PRESENTING FOR ACUTE VISIT.  ON FEB 7 AND 8TH SHE HAD A FEVER AND COLD SYMPTOMS.  A WEEK AND A HALF LATER FELT WEAK.  GETS WAVES OF WEAKNESS SINCE THAT TIME.  DID NOT GET TESTED FOR COVID WHEN OCCURRED.  HAS HAD NO CURRENT FEVER, CONGESTION OR SORE THROAT.  NO COUGH.  NO N/V/D.  NO RASHES.  NO LOST TASTE OR SMELL.  SOMETIMES TIRED ABOUT A WEEK.  WAS WALKING 1 - 1 1/2 MILES.  DID NOT HAVE COVID VACCINATIONS.  NO NEW MEDICATIONS.  APPETITE IS OKAY.  IS NOT ACHY.  SYMPTOMS ARE MORE IN THE MORE MORNING.  WILL GET OCCASIONAL PRESSURE IN CHEST FOR THE PAST 5 DAYS.  NO CHEST PAIN.  NO SHORTNESS OF BREATH OR WHEEZING.  NO EDEMA.  IS OTHERWISE FEELING OKAY.  ON LISINOPRIL FOR HYPERTENSION.  SAW CARDIOLOGY IN SEPTEMBER.

## 2022-03-05 NOTE — REVIEW OF SYSTEMS
[Fatigue] : fatigue [Negative] : Neurological [Fever] : no fever [Chills] : no chills [Palpitations] : no palpitations [Lower Ext Edema] : no lower extremity edema [FreeTextEntry5] : SEE HPI

## 2022-03-05 NOTE — PHYSICAL EXAM
[No Acute Distress] : no acute distress [Well Nourished] : well nourished [Well-Appearing] : well-appearing [Normal Sclera/Conjunctiva] : normal sclera/conjunctiva [PERRL] : pupils equal round and reactive to light [Normal Outer Ear/Nose] : the outer ears and nose were normal in appearance [Normal Oropharynx] : the oropharynx was normal [Normal TMs] : both tympanic membranes were normal [No Respiratory Distress] : no respiratory distress  [No Accessory Muscle Use] : no accessory muscle use [Clear to Auscultation] : lungs were clear to auscultation bilaterally [Normal Rate] : normal rate  [Regular Rhythm] : with a regular rhythm [Normal S1, S2] : normal S1 and S2 [No Joint Swelling] : no joint swelling [Grossly Normal Strength/Tone] : grossly normal strength/tone [Speech Grossly Normal] : speech grossly normal [Memory Grossly Normal] : memory grossly normal [Normal Mood] : the mood was normal

## 2022-03-05 NOTE — PLAN
[FreeTextEntry1] : WILL SWAB FOR FLU, COVID, RSV\par ISOLATION PENDING RESULTS\par WILL ALSO CHECK FOR COVID AB ? RECENT COVID WITH RESIDUAL SYMPTOMS\par WILL GET LAB WORK FOR FATIGUE\par EKG: NSR AT 64 BPM, LVH\par CARDIOLOGY FOLLOW-UP; CONSULTANT NOTE REVIEWED\par AGREES TO CHEST IMAGING IF D-DIMER ABNORMAL\par BLOOD PRESSURE CONTROLLED\par CONTINUE LISINOPRIL 10 MG DAILY\par CALL WITH ANY QUESTIONS, CONCERNS OR CHANGES \par AWARE WHEN TO SEEK EMERGENT CARE

## 2022-03-21 ENCOUNTER — NON-APPOINTMENT (OUTPATIENT)
Age: 72
End: 2022-03-21

## 2022-03-21 ENCOUNTER — APPOINTMENT (OUTPATIENT)
Dept: CARDIOLOGY | Facility: CLINIC | Age: 72
End: 2022-03-21
Payer: MEDICARE

## 2022-03-21 VITALS
DIASTOLIC BLOOD PRESSURE: 78 MMHG | HEART RATE: 58 BPM | BODY MASS INDEX: 32.85 KG/M2 | RESPIRATION RATE: 16 BRPM | HEIGHT: 61 IN | SYSTOLIC BLOOD PRESSURE: 126 MMHG | WEIGHT: 174 LBS

## 2022-03-21 DIAGNOSIS — R00.2 PALPITATIONS: ICD-10-CM

## 2022-03-21 PROCEDURE — 99214 OFFICE O/P EST MOD 30 MIN: CPT

## 2022-03-21 PROCEDURE — 93000 ELECTROCARDIOGRAM COMPLETE: CPT

## 2022-03-21 NOTE — ASSESSMENT
[FreeTextEntry1] : Echocardiogram October 20, 2020 demonstrated left ventricle normal size and function with ejection fraction of 60-65%. Mild mitral, mild to moderate tricuspid regurgitation noted. Mobile intra-atrial septum noted with no PFO noted.\par \par Bubble study November 17, 2020 was normal without  any evidence of intracardiac shunt.\par \par EKG: Sinus rhythm with no significant ST or T wave changes.  Delayed R wave progression.  \par \par 72-year-old female with a past medical history of hypertension who presents to me for follow-up.  Patient recently was dealing with issues with palpitations following a URI.  She was certainly under some degree of stress with that and otherwise and that may be ultimately the cause for her symptoms.  Her palpitations have since resolved.  EKG today is unremarkable.  I have recommended repeating an echocardiogram given the symptoms but do not believe any additional testing would be prudent at this time.  Additional monitoring will not be helpful given her lack of any symptoms but if she starts having palpitations again she will let me know.  Review of recent blood work shows no significant abnormalities but no lipids were done.

## 2022-03-21 NOTE — HISTORY OF PRESENT ILLNESS
[FreeTextEntry1] : Patient presents back to the office today at the request of her primary because she was recently having some issues with palpitations.  She had a URI in February and since that time has been having palpitations.  They became somewhat significant but have actually resolved over the past week.  They would be off and on feeling like her heart was racing.  No associated symptoms.  No dizziness or lightheadedness.  She is now back to feeling well and has no complaints at this time.  She does some exercise and is able to do so without limitation.  Blood pressures have been ranging in the 110s to 120s over 50s to 70s.  Patient denies chest pain, shortness of breath, orthopnea, presyncope, syncope.

## 2022-03-29 ENCOUNTER — APPOINTMENT (OUTPATIENT)
Dept: CARDIOLOGY | Facility: CLINIC | Age: 72
End: 2022-03-29
Payer: MEDICARE

## 2022-03-29 PROCEDURE — 93306 TTE W/DOPPLER COMPLETE: CPT

## 2022-05-31 ENCOUNTER — APPOINTMENT (OUTPATIENT)
Dept: UROGYNECOLOGY | Facility: CLINIC | Age: 72
End: 2022-05-31

## 2022-06-02 ENCOUNTER — APPOINTMENT (OUTPATIENT)
Dept: UROGYNECOLOGY | Facility: CLINIC | Age: 72
End: 2022-06-02
Payer: MEDICARE

## 2022-06-02 VITALS
SYSTOLIC BLOOD PRESSURE: 122 MMHG | HEIGHT: 61 IN | DIASTOLIC BLOOD PRESSURE: 76 MMHG | BODY MASS INDEX: 33.04 KG/M2 | WEIGHT: 175 LBS

## 2022-06-02 DIAGNOSIS — N36.41 HYPERMOBILITY OF URETHRA: ICD-10-CM

## 2022-06-02 DIAGNOSIS — Z86.79 PERSONAL HISTORY OF OTHER DISEASES OF THE CIRCULATORY SYSTEM: ICD-10-CM

## 2022-06-02 DIAGNOSIS — Z87.09 PERSONAL HISTORY OF OTHER DISEASES OF THE RESPIRATORY SYSTEM: ICD-10-CM

## 2022-06-02 DIAGNOSIS — Z86.39 PERSONAL HISTORY OF OTHER ENDOCRINE, NUTRITIONAL AND METABOLIC DISEASE: ICD-10-CM

## 2022-06-02 PROCEDURE — 99204 OFFICE O/P NEW MOD 45 MIN: CPT

## 2022-06-03 LAB
APPEARANCE: CLEAR
BACTERIA: NEGATIVE
BILIRUBIN URINE: NEGATIVE
BLOOD URINE: NEGATIVE
COLOR: YELLOW
GLUCOSE QUALITATIVE U: NEGATIVE
HYALINE CASTS: 0 /LPF
KETONES URINE: NEGATIVE
LEUKOCYTE ESTERASE URINE: NEGATIVE
MICROSCOPIC-UA: NORMAL
NITRITE URINE: NEGATIVE
PH URINE: 6
PROTEIN URINE: NEGATIVE
RED BLOOD CELLS URINE: 0 /HPF
SPECIFIC GRAVITY URINE: 1.01
SQUAMOUS EPITHELIAL CELLS: 0 /HPF
UROBILINOGEN URINE: NORMAL
WHITE BLOOD CELLS URINE: 0 /HPF

## 2022-06-04 PROBLEM — Z87.09 HISTORY OF BRONCHITIS: Status: RESOLVED | Noted: 2022-06-02 | Resolved: 2022-06-04

## 2022-06-04 PROBLEM — Z86.79 HISTORY OF HYPERTENSION: Status: RESOLVED | Noted: 2022-06-02 | Resolved: 2022-06-04

## 2022-06-04 PROBLEM — Z86.39 HISTORY OF OBESITY: Status: RESOLVED | Noted: 2022-06-02 | Resolved: 2022-06-04

## 2022-06-04 LAB — BACTERIA UR CULT: NORMAL

## 2022-06-04 NOTE — PHYSICAL EXAM
[Chaperone Present] : A chaperone was present in the examining room during all aspects of the physical examination [FreeTextEntry1] : Shari

## 2022-06-04 NOTE — OB HISTORY
[Total Preg ___] : : [unfilled] [Vaginal ___] : [unfilled] vaginal delivery(s) [Definite ___ (Date)] : the last menstrual period was [unfilled] [Last Pap Smear ___] : date of last pap smear was on [unfilled] [Sexually Active] : sexually active [Abnormal Pap Smear] : normal pap smear

## 2022-06-04 NOTE — HISTORY OF PRESENT ILLNESS
[FreeTextEntry1] : Patient is a 72 years old  (FAVD x 1,  x 2) who is referred by Dr. Taylor from [facility]  for evaluation and management of prolapse\par Patient reports feeling a vaginal bulge since . She felt like a tampon is falling out. She tried to management it expectantly. But few months ago, she started feeling her cervix and is feeling uncomfortable. She went to get fitted with pessary at Dr. Taylor practice. She was told to see a  urogyn\par She sometimes has to push around the urethra area to release the urine completely\par She reports hx of UTI in 2022, has to try 3 different antibiotics because of difficulty swallowing the pills\par Denies leaking urine with cough/ sneeze\par Denies urinary urgency. Only time she experienced urine leakage was when she has UTI\par She is sexually active. As long as she lubricates, she has no pain or discomfort\par Denies constipation, fecal incontinence etc\par GYN Hx: LMP in , denies hx of PMB. Last pap in 2022- was normal per her report\par PMHX: HTN, impaired glucose tolerance\par PSHx: none

## 2022-06-04 NOTE — ASSESSMENT
[FreeTextEntry1] : Patient is a 72 years old multipara with stage III uterovaginal prolapse mainly involving the cervix and anterior vaginal wall. She reports bulge symptoms and intermittent voiding dysfunction. On exam, she has a normal postvoid residual and a negative cough stress test with and without prolapse reduction.

## 2022-06-04 NOTE — DISCUSSION/SUMMARY
[FreeTextEntry1] : The patient was counseled regarding the pathophysiology of the prolapse. A total of approximately 60 minutes was spent on this visit, greater than 50%of which was spent on counseling. She was also counseled regarding the risks, benefits, indications, and alternatives of further evaluations studies, as well as various management options. Treatment options for prolapse including pessary, vaginal and abdominal reconstructive procedure with and without mesh reviewed. After a detailed discussion, following management plan was outlined:\par 1. Patient desires to have a trial of fitting. She lives in [place] and may schedule an appointment in [place] or . \par 2. She expressed her preference to avoid use of mesh if she decides to proceed with surgical approach. \par 3. UA/ culture was ordered to exclude UTI\par

## 2022-06-10 ENCOUNTER — APPOINTMENT (OUTPATIENT)
Dept: UROGYNECOLOGY | Facility: CLINIC | Age: 72
End: 2022-06-10
Payer: MEDICARE

## 2022-06-10 VITALS
SYSTOLIC BLOOD PRESSURE: 130 MMHG | WEIGHT: 175 LBS | BODY MASS INDEX: 33.04 KG/M2 | HEIGHT: 61 IN | DIASTOLIC BLOOD PRESSURE: 76 MMHG

## 2022-06-10 PROCEDURE — 99213 OFFICE O/P EST LOW 20 MIN: CPT | Mod: 25

## 2022-06-10 PROCEDURE — 57160 INSERT PESSARY/OTHER DEVICE: CPT

## 2022-06-10 NOTE — ASSESSMENT
[FreeTextEntry1] : Patient is a 72 years old multipara with stage III uterovaginal prolapse mainly involving the cervix and anterior vaginal wall. She reports bulge symptoms and intermittent voiding dysfunction. She was fitted with #4 incontinence ring with support pessary. Patient tolerated it well and denied pessary displacement with voiding and walking

## 2022-06-10 NOTE — PHYSICAL EXAM
[Chaperone Present] : A chaperone was present in the examining room during all aspects of the physical examination [FreeTextEntry1] : General: Not in acute distress, alert and oriented x3.\par Pelvic Exam: Normal external female genitalia.  Urethra is hypermobile without prolapse, exudates, or lesions. Cough stress test is negative.  Anterior vaginal wall at hymen at rest. She was first fitted with # 2.5 ring with support pessary which was noted to displace on straining in standing position. She was then fitted with #4 incontinence ring with support pessary (#4 ring with support not available). Patient tolerated the procedure well and states that she doesn't even feel the pessary. Cough stress test was negative with pessary in place in both sitting and standing positions.

## 2022-06-10 NOTE — PROCEDURE
[New] : new [Good Fit] : fits well [Erosion] : no evidence of erosion [Erythema] : no erythema [Discharge] : no vaginal discharge [Infection] : no evidence of infection [Pessary Inserted] : inserted [None] : no bleeding [FreeTextEntry1] : #4 incontinence ring with support [FreeTextEntry8] : done

## 2022-06-10 NOTE — DISCUSSION/SUMMARY
[Visit Time ___ Minutes] : [unfilled] minutes [Face to Face Time___ Minutes] : with [unfilled] minutes in face to face consultation. [FreeTextEntry1] : Pessary maintenance care discussed. \par F/u in 2 weeks. If she likes to continue pessary use, will teach pessary insertion and removal on follow up visit.

## 2022-06-10 NOTE — HISTORY OF PRESENT ILLNESS
[FreeTextEntry1] : Patient is a 72 years old multipara with stage III uterovaginal prolapse mainly involving the cervix and anterior vaginal wall. She reports bulge symptoms and intermittent voiding dysfunction. She was initially seen on 6/2/22. On that exam, she has a normal postvoid residual and a negative cough stress test with and without prolapse reduction. She desires non-surgical management and presents today  to have a trial of pessary fitting. \par Her urine culture returned negative from last visit

## 2022-06-28 DIAGNOSIS — B37.3 CANDIDIASIS OF VULVA AND VAGINA: ICD-10-CM

## 2022-06-30 ENCOUNTER — APPOINTMENT (OUTPATIENT)
Dept: UROGYNECOLOGY | Facility: CLINIC | Age: 72
End: 2022-06-30

## 2022-06-30 VITALS
BODY MASS INDEX: 33.04 KG/M2 | DIASTOLIC BLOOD PRESSURE: 72 MMHG | SYSTOLIC BLOOD PRESSURE: 147 MMHG | WEIGHT: 175 LBS | HEIGHT: 61 IN

## 2022-06-30 DIAGNOSIS — B37.3 CANDIDIASIS OF VULVA AND VAGINA: ICD-10-CM

## 2022-06-30 PROCEDURE — 99214 OFFICE O/P EST MOD 30 MIN: CPT

## 2022-06-30 NOTE — ASSESSMENT
[FreeTextEntry1] : Patient is a 72 years old multipara with stage III uterovaginal prolapse mainly involving the cervix and anterior vaginal wall. She reports bulge symptoms and intermittent voiding dysfunction. She was fitted with #4 incontinence ring with support pessary. Patient is tolerated it well and wants to continue using it.

## 2022-06-30 NOTE — HISTORY OF PRESENT ILLNESS
Was the patient seen in the last year in this department? Yes     Does patient have an active prescription for medications requested? No     Received Request Via: Pharmacy     Last Visit: 8/29/17    Last Labs: 8/16/17   [FreeTextEntry1] : Patient is a 72 years old multipara with stage III uterovaginal prolapse mainly involving the cervix and anterior vaginal wall. She reports bulge symptoms and intermittent voiding dysfunction. She was initially seen on 6/2/22. On that exam, she has a normal postvoid residual and a negative cough stress test with and without prolapse reduction. She desires non-surgical management and was fitted with #4 incontinence ring with support on 6/10/22. She presents today for a pessary follow up. She denies ay issues with pessary. Reports recent visit to an urgent care and treatment with antibiotic for presumed UTI. No urine culture is available to review from that visit. She subsequently developed vulvovaginal itching and was prescribed a single dose of Fluconazole. She reports improvement but not complete resolution of vulvar irritation. \par

## 2022-06-30 NOTE — DISCUSSION/SUMMARY
[Visit Time ___ Minutes] : [unfilled] minutes [Face to Face Time___ Minutes] : with [unfilled] minutes in face to face consultation. [FreeTextEntry1] : Patient was instructed on pessary removal and insertion. Explained to her that she only needs to remove  the pessary for intimacy. Recommended q 3 month pessary checks. She may see her OBGYN for these or return to our practice.\par UA/ culture ordered due to patient report of recent UTI\par Prescription for additional 2 doses of Fluconazole sent.

## 2022-06-30 NOTE — PHYSICAL EXAM
[Chaperone Present] : A chaperone was present in the examining room during all aspects of the physical examination [FreeTextEntry1] : General: Not in acute distress, alert and oriented x3.\par Neck: Supple. No lymphadenopathy. \par Pelvic Exam: Erythema and induration of the skin of external female genitalia. Cough stress test is negative with  pessary in place. No prolapse noted beyond the pessary. Pessary was removed. No blood or abnormal discharge noted on the pessary. Pale and mildly atrophic-appearing vaginal epithelium. Pessary was replaced after cleaning.Patient was instructed on pessary insertion technique.  \par

## 2022-07-15 ENCOUNTER — APPOINTMENT (OUTPATIENT)
Dept: FAMILY MEDICINE | Facility: CLINIC | Age: 72
End: 2022-07-15

## 2022-09-15 ENCOUNTER — NON-APPOINTMENT (OUTPATIENT)
Age: 72
End: 2022-09-15

## 2022-09-15 ENCOUNTER — APPOINTMENT (OUTPATIENT)
Dept: CARDIOLOGY | Facility: CLINIC | Age: 72
End: 2022-09-15

## 2022-09-15 VITALS
DIASTOLIC BLOOD PRESSURE: 78 MMHG | BODY MASS INDEX: 30.78 KG/M2 | OXYGEN SATURATION: 98 % | WEIGHT: 163 LBS | RESPIRATION RATE: 16 BRPM | HEIGHT: 61 IN | HEART RATE: 64 BPM | SYSTOLIC BLOOD PRESSURE: 152 MMHG

## 2022-09-15 DIAGNOSIS — R25.2 CRAMP AND SPASM: ICD-10-CM

## 2022-09-15 PROCEDURE — 93000 ELECTROCARDIOGRAM COMPLETE: CPT

## 2022-09-15 PROCEDURE — 99214 OFFICE O/P EST MOD 30 MIN: CPT | Mod: 25

## 2022-09-15 RX ORDER — FLUCONAZOLE 150 MG/1
150 TABLET ORAL
Qty: 1 | Refills: 0 | Status: DISCONTINUED | COMMUNITY
Start: 2022-06-28 | End: 2022-09-15

## 2022-09-15 RX ORDER — FLUCONAZOLE 150 MG/1
150 TABLET ORAL
Qty: 2 | Refills: 0 | Status: DISCONTINUED | COMMUNITY
Start: 2022-06-30 | End: 2022-09-15

## 2022-09-15 NOTE — ASSESSMENT
[FreeTextEntry1] : Echocardiogram October 20, 2020 demonstrated left ventricle normal size and function with ejection fraction of 60-65%. Mild mitral, mild to moderate tricuspid regurgitation noted. Mobile intra-atrial septum noted with no PFO noted.\par \par Bubble study November 17, 2020 was normal without  any evidence of intracardiac shunt.\par \par Echocardiogram March 29, 2022 demonstrated left ventricle normal size and function with ejection fraction of 55 to 60%.  Borderline enlarged right ventricle with normal RV function.  Trace mitral, mild tricuspid and mild pulmonic regurgitation noted.\par \par EKG: Sinus rhythm with no significant ST or T wave changes.  \par \par 72-year-old female with a past medical history of hypertension who presents to me for follow-up.  Patient presents back today feeling generally well although recently having some muscle cramps in her legs.  The cause of cramps not entirely clear but some changes to her vitamins may also be the reason.  She is changed back to what she was on originally and she started to feel better.  She is also been wearing sneakers more and notes that she feels better when she walks.  I have recommended doing blood work to ensure her electrolytes are in range but do not believe any work-up from a cardiovascular standpoint is needed at this time.  Her echocardiogram shows normal LV function and a decrease in her RV size and is otherwise fairly unremarkable with mild valve disease.  Blood pressures appear to be well controlled at home.

## 2022-09-15 NOTE — HISTORY OF PRESENT ILLNESS
[FreeTextEntry1] : Patient presents to the office today complaining that over the last week she has been having some muscle cramping in her legs.  This seems to occur mostly at night although she does get it during the day sometimes.  It feels better when she walks.  She notes that it started after she did some exercise walking for couple of days straight.  She feels better with regards to the cramping when she wears sneakers and notes that the cramping has been slowly improving.  She feels that she sees a little bit of swelling at times in her legs although they are not very swollen right now.  There is some twitching associated with the cramping and sometimes the cramping can be severe enough that she jumps out of bed.  She reports no other physical symptoms.  When she does do her exercise she has no other symptoms or limitations.  Blood pressures have been in the 110s to 120s over 60s at home.  Patient denies chest pain, shortness of breath, palpitations, orthopnea, presyncope, syncope.

## 2022-09-15 NOTE — DISCUSSION/SUMMARY
[FreeTextEntry1] : 1. She will have blood work done to evaluate her electrolytes and also repeat her lipids.\par 2. Continue lisinopril for hypertension. \par 3. Monitor BP at home, keep a log and bring to f/u.\par 4. Patient encouraged to work on diet to lose weight.  Patient encouraged to work on a healthy diet high in lean protein, whole grains and vegetables, and lower in white flour and simple sugars.\par 5. Patient is encouraged to exercise at least 30 minutes a day everyday of the week.\par 6. Patient encouraged  to find ways to reduce stress, such as meditation and/or yoga.\par 7. No additional cardiac testing at this time.\par 8. If the cramping continues have encouraged her to consider follow-up with her primary and also possibly neurology.\par 9. Follow up here in 6 months.

## 2022-10-04 ENCOUNTER — APPOINTMENT (OUTPATIENT)
Age: 72
End: 2022-10-04

## 2022-10-04 DIAGNOSIS — Z87.440 PERSONAL HISTORY OF URINARY (TRACT) INFECTIONS: ICD-10-CM

## 2022-10-04 PROCEDURE — 99214 OFFICE O/P EST MOD 30 MIN: CPT

## 2022-10-04 NOTE — OB HISTORY
[Vaginal ___] : [unfilled] vaginal delivery(s) [unknown] : the patient is unsure of the date of her LMP [Sexually Active] : sexually active [Abnormal Pap Smear] : normal pap smear [Taking Estrogens] : is not taking estrogen replacement [Abnormal Bleeding] : without bleeding

## 2022-10-04 NOTE — PROCEDURE
[Good Fit] : fits well [Refit] : refit is not needed [Erosion] : no evidence of erosion [Erythema] : no erythema [Discharge] : no vaginal discharge [Infection] : no evidence of infection [Pessary Inserted] : inserted [Medication Review] : Medicaiton Review: Patient verbalizes understanding of risks and benefits [Fluid Management] : Fluid Management: patient verbalizes understanding 6-10 cups per day [Bowel Management] : Bowel Management: patient verbalizes understanding of daily dietary fiber intake [Bladder Training] : Bladder Training: Patient given information with verbal understanding [FreeTextEntry1] : ring with knob  [FreeTextEntry8] : able to do self care

## 2022-10-04 NOTE — PHYSICAL EXAM
[Chaperone Present] : A chaperone was present in the examining room during all aspects of the physical examination [No Acute Distress] : in no acute distress [Well developed] : well developed [Well Nourished] : ~L well nourished [Oriented x3] : oriented to person, place, and time [Normal Memory] : ~T memory was ~L unimpaired [Normal Mood/Affect] : mood and affect are normal [No Edema] : ~T edema was not present [Warm and Dry] : was warm and dry to touch [Normal Gait] : gait was normal [Labia Majora] : were normal [Labia Minora] : were normal [FreeTextEntry1] : . [Cough] : no cough [Tenderness] : ~T no ~M abdominal tenderness observed [Distended] : not distended [Inguinal LAD] : no adenopathy was noted in the inguinal lymph nodes [Normal Appearance] : general appearance was normal [No Bleeding] : there was no active vaginal bleeding [Normal] : was normal [2] : 2 [de-identified] : unable to assess, prolapse noted past pessary

## 2022-10-04 NOTE — DISCUSSION/SUMMARY
[FreeTextEntry1] : \anita Pinedo presents for pessary care, she would like to establish care here as she lives closer, self care done today, h/o of UTIs, discussed vag estrogen, risks/benefits and instructions for use. Will start, not interested in surgery, all questions answered, return in 6 months or sooner.

## 2022-10-04 NOTE — HISTORY OF PRESENT ILLNESS
[Uterine Prolapse] : no [Vaginal Wall Prolapse] : no [Rectal Prolapse] : no [Unable To Restrain Bowel Movement] : no [Hematuria] : no [x1] : nocturia once nightly [Pain During Urination (Dysuria)] : no [Constipation Obstructed Defecation] : no [Incomplete Emptying Of Stool] : no [Pelvic Pain] : no [Vaginal Pain] : no [Vulvar Pain] : no [FreeTextEntry5] : none with pessary  [FreeTextEntry1] : \anita Pinedo presents with many years of prolapse, she reports she recently had a pessary placed, she reports that she is overall happy with the pessary, she reports that she does not have DARCY, she does self care, she had urinary tract infections, has had two in past 6 months, no VB, no constipation, sexually active, does self care

## 2022-11-09 ENCOUNTER — RX RENEWAL (OUTPATIENT)
Age: 72
End: 2022-11-09

## 2022-11-29 ENCOUNTER — APPOINTMENT (OUTPATIENT)
Dept: FAMILY MEDICINE | Facility: CLINIC | Age: 72
End: 2022-11-29

## 2022-11-29 ENCOUNTER — NON-APPOINTMENT (OUTPATIENT)
Age: 72
End: 2022-11-29

## 2022-11-29 VITALS
WEIGHT: 166 LBS | BODY MASS INDEX: 31.34 KG/M2 | HEART RATE: 60 BPM | HEIGHT: 61 IN | OXYGEN SATURATION: 99 % | DIASTOLIC BLOOD PRESSURE: 74 MMHG | SYSTOLIC BLOOD PRESSURE: 136 MMHG

## 2022-11-29 DIAGNOSIS — Z12.39 ENCOUNTER FOR OTHER SCREENING FOR MALIGNANT NEOPLASM OF BREAST: ICD-10-CM

## 2022-11-29 PROCEDURE — 99214 OFFICE O/P EST MOD 30 MIN: CPT

## 2022-11-29 RX ORDER — VITAMIN A 10000 UNIT
TABLET ORAL
Refills: 0 | Status: DISCONTINUED | COMMUNITY
End: 2022-11-29

## 2022-11-29 RX ORDER — ZINC SULFATE 50(220)MG
CAPSULE ORAL
Refills: 0 | Status: DISCONTINUED | COMMUNITY
End: 2022-11-29

## 2022-11-29 RX ORDER — ESTRADIOL 0.1 MG/G
0.1 CREAM VAGINAL
Qty: 1 | Refills: 2 | Status: DISCONTINUED | COMMUNITY
Start: 2022-10-04 | End: 2022-11-29

## 2022-11-29 RX ORDER — CHOLECALCIFEROL (VITAMIN D3) 25 MCG
TABLET ORAL
Refills: 0 | Status: DISCONTINUED | COMMUNITY
End: 2022-11-29

## 2022-11-30 ENCOUNTER — RX RENEWAL (OUTPATIENT)
Age: 72
End: 2022-11-30

## 2022-12-05 PROBLEM — Z12.39 BREAST SCREENING: Status: ACTIVE | Noted: 2021-09-29

## 2022-12-05 NOTE — PLAN
[FreeTextEntry1] : URO/GYN NOTE APPRECIATED AS WELL AS CARDIOLOGY\par COLONOSCOPY REFUSED - PURPOSE REVIEWED\par ROUTINE GYN CARE\par HAS LAB SLIP FROM CARDIOLOGY; PRIOR REVIEWED\par HEALTHY DIET AND LIFESTYLE MODIFICATIONS\par HEALTHY WEIGHT LOSS \par BLOOD PRESSURE CONTROLLED\par CONTINUE LISINOPRIL 10 MG DAILY\par FOLLOW-UP ALL SPECIALISTS AS DIRECTED \par VACCINATIONS DECLINED\par RX MAMMOGRAM AND BONE DENSITY\par CALL WITH ANY QUESTIONS, CONCERNS OR CHANGES

## 2022-12-05 NOTE — HISTORY OF PRESENT ILLNESS
[de-identified] : MS. GO IS A PLEASANT 73 YO PRESENTING FOR FOLLOW-UP.  SAW CARDIOLOGY IN FOLLOW-UP.  HAD EKG.  DIET IS OVERALL HEALTHY.  LOW SODIUM.  AVOIDS FRIED FOODS AND FAST FOODS.  AVOIDS SNACKING.  WALKING.  WILL NOT HAVE ANY VACCINATIONS.  SAW URO/GYN FOR PESSARY CARE.  CHRONIC HISTORY OF HYPERTENSION, HYPERLIPIDEMIA, OBESITY AND PREDIABETES. REMAINS ON LISINOPRIL FOR BLOOD PRESSURE.

## 2023-01-10 LAB — HEMOCCULT STL QL IA: NEGATIVE

## 2023-03-01 LAB
ALBUMIN SERPL ELPH-MCNC: 4.2 G/DL
ALP BLD-CCNC: 104 U/L
ALT SERPL-CCNC: 20 U/L
ANION GAP SERPL CALC-SCNC: 11 MMOL/L
AST SERPL-CCNC: 28 U/L
BILIRUB SERPL-MCNC: 0.4 MG/DL
BUN SERPL-MCNC: 14 MG/DL
CALCIUM SERPL-MCNC: 9.8 MG/DL
CHLORIDE SERPL-SCNC: 102 MMOL/L
CHOLEST SERPL-MCNC: 222 MG/DL
CO2 SERPL-SCNC: 28 MMOL/L
CREAT SERPL-MCNC: 0.8 MG/DL
CRP SERPL HS-MCNC: 1.38 MG/L
EGFR: 77 ML/MIN/1.73M2
ESTIMATED AVERAGE GLUCOSE: 117 MG/DL
GLUCOSE SERPL-MCNC: 101 MG/DL
HBA1C MFR BLD HPLC: 5.7 %
HDLC SERPL-MCNC: 76 MG/DL
LDLC SERPL CALC-MCNC: 132 MG/DL
MAGNESIUM SERPL-MCNC: 2 MG/DL
NONHDLC SERPL-MCNC: 146 MG/DL
POTASSIUM SERPL-SCNC: 4.3 MMOL/L
PROT SERPL-MCNC: 6.9 G/DL
SODIUM SERPL-SCNC: 141 MMOL/L
TRIGL SERPL-MCNC: 71 MG/DL

## 2023-03-08 ENCOUNTER — NON-APPOINTMENT (OUTPATIENT)
Age: 73
End: 2023-03-08

## 2023-03-08 ENCOUNTER — APPOINTMENT (OUTPATIENT)
Dept: CARDIOLOGY | Facility: CLINIC | Age: 73
End: 2023-03-08
Payer: MEDICARE

## 2023-03-08 VITALS
HEIGHT: 61 IN | WEIGHT: 169 LBS | BODY MASS INDEX: 31.91 KG/M2 | RESPIRATION RATE: 16 BRPM | SYSTOLIC BLOOD PRESSURE: 118 MMHG | DIASTOLIC BLOOD PRESSURE: 70 MMHG | HEART RATE: 66 BPM

## 2023-03-08 PROCEDURE — 99214 OFFICE O/P EST MOD 30 MIN: CPT | Mod: 25

## 2023-03-08 PROCEDURE — 93000 ELECTROCARDIOGRAM COMPLETE: CPT

## 2023-03-08 NOTE — DISCUSSION/SUMMARY
[FreeTextEntry1] : 1. Continue lisinopril for hypertension. \par 2. Monitor BP at home, keep a log and bring to f/u.\par 3. Patient encouraged to work on diet to lose weight.  Patient encouraged to work on a healthy diet high in lean protein, whole grains and vegetables, and lower in white flour and simple sugars.\par 4. Patient is encouraged to exercise at least 30 minutes a day everyday of the week.\par 5. No additional cardiac testing at this time.\par 6. Follow up here in 6 months. [EKG obtained to assist in diagnosis and management of assessed problem(s)] : EKG obtained to assist in diagnosis and management of assessed problem(s)

## 2023-03-08 NOTE — ASSESSMENT
[FreeTextEntry1] : Echocardiogram October 20, 2020 demonstrated left ventricle normal size and function with ejection fraction of 60-65%. Mild mitral, mild to moderate tricuspid regurgitation noted. Mobile intra-atrial septum noted with no PFO noted.\par \par Bubble study November 17, 2020 was normal without  any evidence of intracardiac shunt.\par \par Echocardiogram March 29, 2022 demonstrated left ventricle normal size and function with ejection fraction of 55 to 60%.  Borderline enlarged right ventricle with normal RV function.  Trace mitral, mild tricuspid and mild pulmonic regurgitation noted.\par \par EKG: Sinus rhythm with no significant ST or T wave changes.  \par \par 72-year-old female with a past medical history of hypertension who presents to me for follow-up.  Patient presents back today seemingly stable from a cardiovascular standpoint.  She is noted to have an elevated [place] risk based on recent blood work which included an LDL that has gone up.  Her [place] risk is near 20%.  I have suggested statin therapy but she is very hesitant to consider that.  I have asked her to consider doing a calcium score to allow for better risk stratification and she will consider that but seems unlikely to want to do that as well.  Blood pressures appear to be well controlled even though her machine is reading high.  I will not make any changes to her medication today.  EKG today is unremarkable.

## 2023-03-08 NOTE — HISTORY OF PRESENT ILLNESS
[FreeTextEntry1] : Patient presents to the office today having had COVID in November but having recovered well.  Unfortunately her  developed severe thrombocytopenia and has been in and out of the hospital since then after he had COVID as well.  She has not been as active as she has been taking care of him but reports no symptoms and her normal daily activities.  Blood pressures at home have been in the 110s to 120s over 50s to 70s although her machine is reading a bit high here in the office today.  Patient denies chest pain, shortness of breath, palpitations, orthopnea, presyncope, syncope.

## 2023-04-12 ENCOUNTER — APPOINTMENT (OUTPATIENT)
Dept: UROGYNECOLOGY | Facility: CLINIC | Age: 73
End: 2023-04-12
Payer: MEDICARE

## 2023-04-12 VITALS
HEIGHT: 61 IN | DIASTOLIC BLOOD PRESSURE: 79 MMHG | BODY MASS INDEX: 31.53 KG/M2 | WEIGHT: 167 LBS | SYSTOLIC BLOOD PRESSURE: 131 MMHG

## 2023-04-12 DIAGNOSIS — Z46.89 ENCOUNTER FOR FITTING AND ADJUSTMENT OF OTHER SPECIFIED DEVICES: ICD-10-CM

## 2023-04-12 PROCEDURE — 99212 OFFICE O/P EST SF 10 MIN: CPT

## 2023-06-19 ENCOUNTER — RX RENEWAL (OUTPATIENT)
Age: 73
End: 2023-06-19

## 2023-06-22 ENCOUNTER — RX RENEWAL (OUTPATIENT)
Age: 73
End: 2023-06-22

## 2023-07-17 ENCOUNTER — RX RENEWAL (OUTPATIENT)
Age: 73
End: 2023-07-17

## 2023-08-14 ENCOUNTER — RX RENEWAL (OUTPATIENT)
Age: 73
End: 2023-08-14

## 2023-08-24 ENCOUNTER — APPOINTMENT (OUTPATIENT)
Dept: FAMILY MEDICINE | Facility: CLINIC | Age: 73
End: 2023-08-24

## 2023-09-06 ENCOUNTER — NON-APPOINTMENT (OUTPATIENT)
Age: 73
End: 2023-09-06

## 2023-09-06 ENCOUNTER — APPOINTMENT (OUTPATIENT)
Dept: CARDIOLOGY | Facility: CLINIC | Age: 73
End: 2023-09-06
Payer: MEDICARE

## 2023-09-06 VITALS
DIASTOLIC BLOOD PRESSURE: 72 MMHG | SYSTOLIC BLOOD PRESSURE: 116 MMHG | HEART RATE: 63 BPM | RESPIRATION RATE: 16 BRPM | BODY MASS INDEX: 31.72 KG/M2 | WEIGHT: 168 LBS | HEIGHT: 61 IN

## 2023-09-06 DIAGNOSIS — Z91.89 OTHER SPECIFIED PERSONAL RISK FACTORS, NOT ELSEWHERE CLASSIFIED: ICD-10-CM

## 2023-09-06 DIAGNOSIS — E66.9 OBESITY, UNSPECIFIED: ICD-10-CM

## 2023-09-06 PROCEDURE — 93000 ELECTROCARDIOGRAM COMPLETE: CPT

## 2023-09-06 PROCEDURE — 99214 OFFICE O/P EST MOD 30 MIN: CPT | Mod: 25

## 2023-09-06 NOTE — DISCUSSION/SUMMARY
[FreeTextEntry1] : 1. Continue lisinopril for hypertension.  2. Monitor BP at home, keep a log and bring to f/u. 3. Patient encouraged to work on diet to lose weight.  Patient encouraged to work on a healthy diet high in lean protein, whole grains and vegetables, and lower in white flour and simple sugars. 4. Patient is encouraged to exercise at least 30 minutes a day everyday of the week. 5. No additional cardiac testing at this time. 6. She will discuss with her primary and have repeat blood work regarding statin therapy.  I once again have encouraged her to consider this giving her high [place] risk but she is unwilling at this time. 7. Follow up here in one year or as needed. [EKG obtained to assist in diagnosis and management of assessed problem(s)] : EKG obtained to assist in diagnosis and management of assessed problem(s)

## 2023-09-06 NOTE — ASSESSMENT
[FreeTextEntry1] : Echocardiogram October 20, 2020 demonstrated left ventricle normal size and function with ejection fraction of 60-65%. Mild mitral, mild to moderate tricuspid regurgitation noted. Mobile intra-atrial septum noted with no PFO noted.  Bubble study November 17, 2020 was normal without  any evidence of intracardiac shunt.  Echocardiogram March 29, 2022 demonstrated left ventricle normal size and function with ejection fraction of 55 to 60%.  Borderline enlarged right ventricle with normal RV function.  Trace mitral, mild tricuspid and mild pulmonic regurgitation noted.  EKG: Sinus rhythm with no significant ST or T wave changes.    72-year-old female with a past medical history of hypertension who presents to me for follow-up.  Patient presents back today stable from a cardiovascular standpoint.  Her blood pressures appear to be well controlled.  She is doing some low-level exercise without a problem.  We talked again extensively about her elevated [place] risk and the benefits of statin therapy.  She continues to resist this and refused it at this time.  She is concerned about potential side effects.  She will have repeat blood work next month with her primary and speak to her about that as well.  EKG today is unremarkable.

## 2023-09-14 ENCOUNTER — RX RENEWAL (OUTPATIENT)
Age: 73
End: 2023-09-14

## 2023-09-19 ENCOUNTER — APPOINTMENT (OUTPATIENT)
Dept: FAMILY MEDICINE | Facility: CLINIC | Age: 73
End: 2023-09-19
Payer: MEDICARE

## 2023-09-19 VITALS
WEIGHT: 168 LBS | DIASTOLIC BLOOD PRESSURE: 58 MMHG | HEIGHT: 61 IN | SYSTOLIC BLOOD PRESSURE: 116 MMHG | OXYGEN SATURATION: 98 % | BODY MASS INDEX: 31.72 KG/M2 | HEART RATE: 61 BPM

## 2023-09-19 DIAGNOSIS — Z00.00 ENCOUNTER FOR GENERAL ADULT MEDICAL EXAMINATION W/OUT ABNORMAL FINDINGS: ICD-10-CM

## 2023-09-19 PROCEDURE — G0439: CPT

## 2023-09-25 NOTE — ED PROVIDER NOTE - NS ED MD EM SELECTION
Patient approaching nurses station requesting something for anxiety. PRN Atarax 50 mg given @ 1416. [id number] [facility]

## 2023-10-01 PROBLEM — Z00.00 ENCOUNTER FOR PREVENTIVE HEALTH EXAMINATION: Status: ACTIVE | Noted: 2020-08-24

## 2023-10-03 ENCOUNTER — APPOINTMENT (OUTPATIENT)
Dept: UROGYNECOLOGY | Facility: CLINIC | Age: 73
End: 2023-10-03
Payer: MEDICARE

## 2023-10-03 DIAGNOSIS — Z46.89 ENCOUNTER FOR FITTING AND ADJUSTMENT OF OTHER SPECIFIED DEVICES: ICD-10-CM

## 2023-10-03 PROCEDURE — 99213 OFFICE O/P EST LOW 20 MIN: CPT

## 2023-12-01 LAB — HEMOCCULT STL QL IA: NEGATIVE

## 2024-02-08 ENCOUNTER — APPOINTMENT (OUTPATIENT)
Dept: RADIOLOGY | Facility: CLINIC | Age: 74
End: 2024-02-08

## 2024-04-02 ENCOUNTER — APPOINTMENT (OUTPATIENT)
Dept: UROGYNECOLOGY | Facility: CLINIC | Age: 74
End: 2024-04-02
Payer: MEDICARE

## 2024-04-02 DIAGNOSIS — N81.2 INCOMPLETE UTEROVAGINAL PROLAPSE: ICD-10-CM

## 2024-04-02 DIAGNOSIS — N95.2 POSTMENOPAUSAL ATROPHIC VAGINITIS: ICD-10-CM

## 2024-04-02 DIAGNOSIS — N89.8 OTHER SPECIFIED NONINFLAMMATORY DISORDERS OF VAGINA: ICD-10-CM

## 2024-04-02 PROCEDURE — 99213 OFFICE O/P EST LOW 20 MIN: CPT

## 2024-06-25 ENCOUNTER — LABORATORY RESULT (OUTPATIENT)
Age: 74
End: 2024-06-25

## 2024-06-27 ENCOUNTER — APPOINTMENT (OUTPATIENT)
Dept: FAMILY MEDICINE | Facility: CLINIC | Age: 74
End: 2024-06-27
Payer: MEDICARE

## 2024-06-27 VITALS
DIASTOLIC BLOOD PRESSURE: 62 MMHG | HEART RATE: 59 BPM | SYSTOLIC BLOOD PRESSURE: 110 MMHG | OXYGEN SATURATION: 97 % | HEIGHT: 61 IN | WEIGHT: 173 LBS | BODY MASS INDEX: 32.66 KG/M2

## 2024-06-27 VITALS — SYSTOLIC BLOOD PRESSURE: 118 MMHG | DIASTOLIC BLOOD PRESSURE: 58 MMHG

## 2024-06-27 DIAGNOSIS — R74.8 ABNORMAL LEVELS OF OTHER SERUM ENZYMES: ICD-10-CM

## 2024-06-27 DIAGNOSIS — E78.5 HYPERLIPIDEMIA, UNSPECIFIED: ICD-10-CM

## 2024-06-27 DIAGNOSIS — R73.03 PREDIABETES.: ICD-10-CM

## 2024-06-27 DIAGNOSIS — I10 ESSENTIAL (PRIMARY) HYPERTENSION: ICD-10-CM

## 2024-06-27 LAB
ALBUMIN SERPL ELPH-MCNC: 4.2 G/DL
ALP BLD-CCNC: 132 U/L
ALT SERPL-CCNC: 21 U/L
ANION GAP SERPL CALC-SCNC: 10 MMOL/L
APPEARANCE: CLEAR
AST SERPL-CCNC: 32 U/L
BASOPHILS # BLD AUTO: 0.04 K/UL
BASOPHILS NFR BLD AUTO: 0.6 %
BILIRUB SERPL-MCNC: 0.5 MG/DL
BILIRUBIN URINE: NEGATIVE
BLOOD URINE: NEGATIVE
BUN SERPL-MCNC: 14 MG/DL
CALCIUM SERPL-MCNC: 9.4 MG/DL
CHLORIDE SERPL-SCNC: 103 MMOL/L
CHOLEST SERPL-MCNC: 232 MG/DL
CO2 SERPL-SCNC: 28 MMOL/L
COLOR: YELLOW
CREAT SERPL-MCNC: 0.84 MG/DL
EGFR: 72 ML/MIN/1.73M2
EOSINOPHIL # BLD AUTO: 0.46 K/UL
EOSINOPHIL NFR BLD AUTO: 7.3 %
ESTIMATED AVERAGE GLUCOSE: 120 MG/DL
GLUCOSE QUALITATIVE U: NEGATIVE MG/DL
GLUCOSE SERPL-MCNC: 91 MG/DL
HBA1C MFR BLD HPLC: 5.8 %
HCT VFR BLD CALC: 41.9 %
HDLC SERPL-MCNC: 71 MG/DL
HGB BLD-MCNC: 13.9 G/DL
IMM GRANULOCYTES NFR BLD AUTO: 0.2 %
KETONES URINE: NEGATIVE MG/DL
LDLC SERPL CALC-MCNC: 148 MG/DL
LEUKOCYTE ESTERASE URINE: ABNORMAL
LYMPHOCYTES # BLD AUTO: 2.39 K/UL
LYMPHOCYTES NFR BLD AUTO: 37.8 %
MAN DIFF?: NORMAL
MCHC RBC-ENTMCNC: 31.4 PG
MCHC RBC-ENTMCNC: 33.2 GM/DL
MCV RBC AUTO: 94.8 FL
MONOCYTES # BLD AUTO: 0.61 K/UL
MONOCYTES NFR BLD AUTO: 9.6 %
NEUTROPHILS # BLD AUTO: 2.82 K/UL
NEUTROPHILS NFR BLD AUTO: 44.5 %
NITRITE URINE: NEGATIVE
NONHDLC SERPL-MCNC: 161 MG/DL
PH URINE: 5.5
PLATELET # BLD AUTO: 288 K/UL
POTASSIUM SERPL-SCNC: 4.2 MMOL/L
PROT SERPL-MCNC: 7.3 G/DL
PROTEIN URINE: NEGATIVE MG/DL
RBC # BLD: 4.42 M/UL
RBC # FLD: 12.5 %
SODIUM SERPL-SCNC: 141 MMOL/L
SPECIFIC GRAVITY URINE: 1.02
T4 FREE SERPL-MCNC: 1 NG/DL
TRIGL SERPL-MCNC: 78 MG/DL
TSH SERPL-ACNC: 1.85 UIU/ML
UROBILINOGEN URINE: 0.2 MG/DL
WBC # FLD AUTO: 6.33 K/UL

## 2024-06-27 PROCEDURE — 99214 OFFICE O/P EST MOD 30 MIN: CPT

## 2024-06-27 PROCEDURE — G2211 COMPLEX E/M VISIT ADD ON: CPT

## 2024-06-30 PROBLEM — E78.5 DYSLIPIDEMIA: Status: ACTIVE | Noted: 2021-09-22

## 2024-06-30 PROBLEM — R73.03 PREDIABETES: Status: ACTIVE | Noted: 2021-09-22

## 2024-06-30 PROBLEM — R74.8 ALKALINE PHOSPHATASE ELEVATION: Status: ACTIVE | Noted: 2024-06-27

## 2024-06-30 PROBLEM — I10 BENIGN ESSENTIAL HYPERTENSION: Status: ACTIVE | Noted: 2020-09-28

## 2024-08-01 ENCOUNTER — APPOINTMENT (OUTPATIENT)
Dept: FAMILY MEDICINE | Facility: CLINIC | Age: 74
End: 2024-08-01

## 2024-08-01 VITALS
BODY MASS INDEX: 32.47 KG/M2 | OXYGEN SATURATION: 98 % | HEART RATE: 64 BPM | HEIGHT: 61 IN | DIASTOLIC BLOOD PRESSURE: 62 MMHG | SYSTOLIC BLOOD PRESSURE: 140 MMHG | WEIGHT: 172 LBS

## 2024-08-01 VITALS — DIASTOLIC BLOOD PRESSURE: 58 MMHG | SYSTOLIC BLOOD PRESSURE: 110 MMHG

## 2024-08-01 PROCEDURE — 99213 OFFICE O/P EST LOW 20 MIN: CPT

## 2024-08-01 PROCEDURE — 36415 COLL VENOUS BLD VENIPUNCTURE: CPT

## 2024-08-01 NOTE — HISTORY OF PRESENT ILLNESS
[de-identified] : BP CHECKING AT HOME.  READINGS AT HOME 119/72, 114/69, 112/67.  THIS MORNING 110/66.   LISINOPRIL LOWERED TO 5 MG DAILY

## 2024-08-02 LAB
ALP BLD-CCNC: 129 U/L
GGT SERPL-CCNC: 57 U/L

## 2024-08-05 LAB
ALP BLD-CCNC: 123 IU/L
ALP BONE CFR SERPL: 33 %
ALP INTEST CFR SERPL: 16 %
ALP LIVER CFR SERPL: 51 %

## 2024-08-16 ENCOUNTER — NON-APPOINTMENT (OUTPATIENT)
Age: 74
End: 2024-08-16

## 2024-08-20 ENCOUNTER — APPOINTMENT (OUTPATIENT)
Dept: FAMILY MEDICINE | Facility: CLINIC | Age: 74
End: 2024-08-20
Payer: MEDICARE

## 2024-08-20 VITALS
DIASTOLIC BLOOD PRESSURE: 58 MMHG | WEIGHT: 170 LBS | SYSTOLIC BLOOD PRESSURE: 116 MMHG | HEIGHT: 61 IN | OXYGEN SATURATION: 95 % | BODY MASS INDEX: 32.1 KG/M2 | HEART RATE: 71 BPM

## 2024-08-20 DIAGNOSIS — R74.8 ABNORMAL LEVELS OF OTHER SERUM ENZYMES: ICD-10-CM

## 2024-08-20 PROCEDURE — 99213 OFFICE O/P EST LOW 20 MIN: CPT

## 2024-08-22 ENCOUNTER — APPOINTMENT (OUTPATIENT)
Dept: GASTROENTEROLOGY | Facility: CLINIC | Age: 74
End: 2024-08-22
Payer: MEDICARE

## 2024-08-22 VITALS
DIASTOLIC BLOOD PRESSURE: 73 MMHG | OXYGEN SATURATION: 96 % | WEIGHT: 171 LBS | SYSTOLIC BLOOD PRESSURE: 132 MMHG | HEIGHT: 61 IN | BODY MASS INDEX: 32.28 KG/M2 | RESPIRATION RATE: 14 BRPM | HEART RATE: 59 BPM

## 2024-08-22 DIAGNOSIS — Z87.440 PERSONAL HISTORY OF URINARY (TRACT) INFECTIONS: ICD-10-CM

## 2024-08-22 DIAGNOSIS — R73.03 PREDIABETES.: ICD-10-CM

## 2024-08-22 DIAGNOSIS — N81.2 INCOMPLETE UTEROVAGINAL PROLAPSE: ICD-10-CM

## 2024-08-22 DIAGNOSIS — R00.2 PALPITATIONS: ICD-10-CM

## 2024-08-22 DIAGNOSIS — E78.5 HYPERLIPIDEMIA, UNSPECIFIED: ICD-10-CM

## 2024-08-22 DIAGNOSIS — Z46.89 ENCOUNTER FOR FITTING AND ADJUSTMENT OF OTHER SPECIFIED DEVICES: ICD-10-CM

## 2024-08-22 PROBLEM — R74.8 ELEVATED SERUM GGT LEVEL: Status: ACTIVE | Noted: 2024-08-22

## 2024-08-22 PROBLEM — K80.20 GALLSTONES: Status: ACTIVE | Noted: 2024-08-22

## 2024-08-22 PROBLEM — R10.11 ABDOMINAL PAIN, ACUTE, RIGHT UPPER QUADRANT: Status: ACTIVE | Noted: 2024-08-22

## 2024-08-22 PROCEDURE — 99204 OFFICE O/P NEW MOD 45 MIN: CPT

## 2024-08-22 NOTE — REASON FOR VISIT
[Initial Evaluation] : an initial evaluation [FreeTextEntry1] : Slightly elevated alkaline phosphatase and GGTP

## 2024-08-22 NOTE — PHYSICAL EXAM
[Alert] : alert [Normal Voice/Communication] : normal voice/communication [Healthy Appearing] : healthy appearing [No Acute Distress] : no acute distress [Sclera] : the sclera and conjunctiva were normal [Hearing Threshold Finger Rub Not [place]] : hearing was normal [Normal Lips/Gums] : the lips and gums were normal [Oropharynx] : the oropharynx was normal [Normal Appearance] : the appearance of the neck was normal [No Respiratory Distress] : no respiratory distress [No Acc Muscle Use] : no accessory muscle use [Respiration, Rhythm And Depth] : normal respiratory rhythm and effort [Auscultation Breath Sounds / Voice Sounds] : lungs were clear to auscultation bilaterally [Heart Rate And Rhythm] : heart rate was normal and rhythm regular [Normal S1, S2] : normal S1 and S2 [Murmurs] : no murmurs [Bowel Sounds] : normal bowel sounds [Abdomen Tenderness] : non-tender [No Masses] : no abdominal mass palpated [Abdomen Soft] : soft [] : no hepatosplenomegaly [Oriented To Time, Place, And Person] : oriented to person, place, and time

## 2024-08-22 NOTE — ASSESSMENT
[FreeTextEntry1] : The episode of right upper quadrant pain may have been due to a brief episode of biliary colic.  The elevated GGT and alkaline phosphatase may be related to the presence of gallstones and she will obtain an MRCP and MRI of the abdomen for further evaluation.  She will also obtain repeat liver function test as well as a full set of hepatitis serologies and celiac antibodies.  Further recommendations will depend upon the results of the above.  As long as there is no evidence of any common bile duct stones a cholecystectomy would certainly be reasonable.  Lastly I will obtain the results of her recent abdominal sonogram for my review.  Further recommendations will depend upon the results of the above.

## 2024-08-22 NOTE — HISTORY OF PRESENT ILLNESS
[FreeTextEntry1] : In June of this year routine blood test revealed an alkaline phosphatase that was slightly elevated at 132.  On August 1 a repeat alkaline phosphatase was 129 and a GGT was elevated at 57.  Both elevations are very mild.  The remainder of her liver function tests have always been normal.  In 2023 her liver function test were normal.  There is no prior history of liver disease.  She denies any history of alcohol abuse.  Many years ago, while pregnant, she was diagnosed as having gallstones.  However she did not undergo surgical intervention.  About 1 week ago she experienced right upper quadrant pain described as an ache or pressure sensation that lasted for 15 minutes and then resolved with some residual soreness in that area thereafter.  She was seen in an urgent care center, [facility] for an ultrasound at [facility] which apparently revealed gallstones but no biliary dilation although the results are unavailable for my review at this time.  She has an appointment to see a surgeon and was referred for further evaluation by a gastroenterologist as well.

## 2024-08-25 LAB
A1AT SERPL-MCNC: 146 MG/DL
AFP-TM SERPL-MCNC: 3.5 NG/ML
ALBUMIN SERPL ELPH-MCNC: 4.1 G/DL
ALP BLD-CCNC: 118 U/L
ALT SERPL-CCNC: 23 U/L
ANA SER IF-ACNC: NEGATIVE
AST SERPL-CCNC: 33 U/L
BILIRUB SERPL-MCNC: 0.5 MG/DL
CERULOPLASMIN SERPL-MCNC: 28 MG/DL
FERRITIN SERPL-MCNC: 79 NG/ML
GGT SERPL-CCNC: 56 U/L
GLIADIN IGA SER QL: <0.2 U/ML
GLIADIN IGG SER QL: <0.4 U/ML
GLIADIN PEPTIDE IGA SER-ACNC: NEGATIVE
GLIADIN PEPTIDE IGG SER-ACNC: NEGATIVE
HBV CORE IGG+IGM SER QL: NONREACTIVE
HBV SURFACE AB SER QL: NONREACTIVE
HBV SURFACE AG SER QL: NONREACTIVE
HCV AB SER QL: NONREACTIVE
HCV S/CO RATIO: 0.11 S/CO
IGA SER QL IEP: 325 MG/DL
INR PPP: 1.05 RATIO
IRON SATN MFR SERPL: 30 %
IRON SERPL-MCNC: 91 UG/DL
LKM AB SER QL IF: <20.1 UNITS
MITOCHONDRIA AB SER IF-ACNC: NORMAL
PT BLD: 11.8 SEC
SMOOTH MUSCLE AB SER QL IF: ABNORMAL
TIBC SERPL-MCNC: 301 UG/DL
TTG IGA SER IA-ACNC: <0.5 U/ML
TTG IGA SER-ACNC: NEGATIVE
TTG IGG SER IA-ACNC: <0.8 U/ML
TTG IGG SER IA-ACNC: NEGATIVE
UIBC SERPL-MCNC: 211 UG/DL

## 2024-08-25 NOTE — PHYSICAL EXAM
[No Acute Distress] : no acute distress [Well Nourished] : well nourished [Well-Appearing] : well-appearing [No Respiratory Distress] : no respiratory distress  [No Accessory Muscle Use] : no accessory muscle use [Clear to Auscultation] : lungs were clear to auscultation bilaterally [Normal Rate] : normal rate  [Regular Rhythm] : with a regular rhythm [Normal S1, S2] : normal S1 and S2 [Soft] : abdomen soft [Non Tender] : non-tender [Normal Bowel Sounds] : normal bowel sounds [No Joint Swelling] : no joint swelling

## 2024-08-25 NOTE — PLAN
[FreeTextEntry1] : WILL REFER TO SURGERY FOR GALLSTONE EVALUATION LAB WORK FROM 8/1/24 REVIEWED: GGTP 57, ALK PHOS 129 WILL ALSO REFER TO GI FOR FURTHER EVALUATION OF LAB ABNORMALITIES WATCH DIET TO ER IN INTERIM IF SYMPTOMATIC CALL WITH ANY QUESTIONS, CONCERNS OR CHANGES

## 2024-08-25 NOTE — HISTORY OF PRESENT ILLNESS
[FreeTextEntry1] : GALLSTONES [de-identified] : MS. GO IS A PLEASANT 75 YO PRESENTING FOR FOLLOW-UP.  HAD RUQ PAIN LAST WEEK.  STATES "PRIOR GALLBLADDER ISSUE" MANY YEARS AGO WHEN PREGNANT.  DID NOT HAVE REMOVED.  WENT TO URGENT CARE.  HAD ABDOMINAL US.  ADVISED GALLSTONES.  IS WATCHING DIET.  SMALLER MEALS .  FOOD NOT TRIGGERING NOW.  IS OTHERWISE FEELING OKAY.

## 2024-08-25 NOTE — HISTORY OF PRESENT ILLNESS
[FreeTextEntry1] : GALLSTONES [de-identified] : MS. GO IS A PLEASANT 73 YO PRESENTING FOR FOLLOW-UP.  HAD RUQ PAIN LAST WEEK.  STATES "PRIOR GALLBLADDER ISSUE" MANY YEARS AGO WHEN PREGNANT.  DID NOT HAVE REMOVED.  WENT TO URGENT CARE.  HAD ABDOMINAL US.  ADVISED GALLSTONES.  IS WATCHING DIET.  SMALLER MEALS .  FOOD NOT TRIGGERING NOW.  IS OTHERWISE FEELING OKAY.

## 2024-08-25 NOTE — PHYSICAL EXAM
[No Acute Distress] : no acute distress [Well Nourished] : well nourished [Well-Appearing] : well-appearing [No Respiratory Distress] : no respiratory distress  [No Accessory Muscle Use] : no accessory muscle use [Clear to Auscultation] : lungs were clear to auscultation bilaterally [Regular Rhythm] : with a regular rhythm [Normal Rate] : normal rate  [Normal S1, S2] : normal S1 and S2 [Soft] : abdomen soft [Non Tender] : non-tender [Normal Bowel Sounds] : normal bowel sounds [No Joint Swelling] : no joint swelling

## 2024-08-26 ENCOUNTER — APPOINTMENT (OUTPATIENT)
Dept: SURGERY | Facility: CLINIC | Age: 74
End: 2024-08-26
Payer: MEDICARE

## 2024-08-26 VITALS
OXYGEN SATURATION: 96 % | RESPIRATION RATE: 16 BRPM | SYSTOLIC BLOOD PRESSURE: 157 MMHG | BODY MASS INDEX: 35.04 KG/M2 | TEMPERATURE: 97.7 F | HEART RATE: 59 BPM | HEIGHT: 58.5 IN | WEIGHT: 171.5 LBS | DIASTOLIC BLOOD PRESSURE: 77 MMHG

## 2024-08-26 DIAGNOSIS — K80.20 CALCULUS OF GALLBLADDER W/OUT CHOLECYSTITIS W/OUT OBSTRUCTION: ICD-10-CM

## 2024-08-26 DIAGNOSIS — R10.11 RIGHT UPPER QUADRANT PAIN: ICD-10-CM

## 2024-08-26 PROCEDURE — 99203 OFFICE O/P NEW LOW 30 MIN: CPT

## 2024-08-26 NOTE — DATA REVIEWED
[FreeTextEntry1] : Independent review of the ultrasound reveals gallstones with no gallbladder wall thickneing or fluid

## 2024-08-26 NOTE — CONSULT LETTER
[Dear  ___] : Dear  [unfilled], [Consult Letter:] : I had the pleasure of evaluating your patient, [unfilled]. [Please see my note below.] : Please see my note below. [Consult Closing:] : Thank you very much for allowing me to participate in the care of this patient.  If you have any questions, please do not hesitate to contact me. [Sincerely,] : Sincerely, [FreeTextEntry3] : Des Velasco MD, FACS   Department of Surgery [facility]

## 2024-08-26 NOTE — HISTORY OF PRESENT ILLNESS
[de-identified] : The patient comes to the office in consultation by Dr. Vani Virk for evaluation of right upper abdominal pain and gallstones seen on ultrasound. The patient reports that about 2 weeks ago she had right upper abdominal pain that felt like a gas pocket with discomfort that radiated to her back. She had mild nausea, no vomit, no fevers or chills.  She denied being jaundice or having dark urine. She reports that she has modified her diet and has no longer had the pain.

## 2024-08-26 NOTE — PHYSICAL EXAM
[JVD] : no jugular venous distention  [Purpura] : no purpura  [Petechiae] : no petechiae [Skin Ulcer] : no ulcer [Skin Induration] : no induration [Alert] : alert [Oriented to Person] : oriented to person [Oriented to Place] : oriented to place [Oriented to Time] : oriented to time [Calm] : calm [de-identified] : nontoxic, in no acute distress, [de-identified] : NC/AT PERRL EOMI no scleral icterus  [de-identified] : trachea midline [de-identified] : no audible wheezing or stridor  [de-identified] : obese soft, no localizing tenderness, no guarding, no rebound, no masses  [de-identified] : FROM of the extremities with no gross angulation or deformity, no abdominal wall herniation  [de-identified] : mood is calm

## 2024-08-26 NOTE — ASSESSMENT
[FreeTextEntry1] : The patient is a 74-year-old female with gallstones. She had a single episode of abdominal pain about 2 weeks ago. She has modified her diet with no further episodes of pain.  She has been advised that should diet modification fail to alleviate the episodes of pain then she will benefit from surgery.  The risks, benefits, and alternatives including the option of doing nothing to a robotic, possible laparoscopic, and possible open cholecystectomy were discussed.  The potential complications including but not limited to infection, bleeding, bile leak, bowel injury and/or obstruction, chronic post op or recurrent abdominal pain, and possible bile duct injury were discussed.  The patient also understands that post op dietary tolerances and bowel movements may also be affected and different from prior to surgery.  The patient understands and wishes to proceed with conservative management and diet modification for now.  She will follow up in 3 months or sooner should any problems arise.  A total of 40 minutes was spent coordinating the patient's care.

## 2024-08-27 LAB
5NT SERPL-CCNC: 5 IU/L
ENDOMYSIUM IGA SER QL: NEGATIVE
ENDOMYSIUM IGA TITR SER: NORMAL

## 2024-09-12 ENCOUNTER — APPOINTMENT (OUTPATIENT)
Dept: MRI IMAGING | Facility: CLINIC | Age: 74
End: 2024-09-12

## 2024-09-19 ENCOUNTER — APPOINTMENT (OUTPATIENT)
Dept: FAMILY MEDICINE | Facility: CLINIC | Age: 74
End: 2024-09-19

## 2024-09-19 ENCOUNTER — NON-APPOINTMENT (OUTPATIENT)
Age: 74
End: 2024-09-19

## 2024-09-19 VITALS
OXYGEN SATURATION: 98 % | WEIGHT: 169 LBS | HEIGHT: 58 IN | BODY MASS INDEX: 35.48 KG/M2 | SYSTOLIC BLOOD PRESSURE: 130 MMHG | DIASTOLIC BLOOD PRESSURE: 64 MMHG | HEART RATE: 64 BPM

## 2024-09-19 PROCEDURE — G0439: CPT

## 2024-09-19 PROCEDURE — 93000 ELECTROCARDIOGRAM COMPLETE: CPT

## 2024-09-19 NOTE — HISTORY OF PRESENT ILLNESS
[FreeTextEntry1] : WELLNESS EXAM [de-identified] : MS. GO IS A PLEASANT 72 YO PRESENTING FOR YEARLY WELLNESS EXAM. HISTORY OF HYPERTENSION, HYPERLIPIDEMIA, PREDIABETES AND OBESITY. FEELS WELL TODAY. SAW GI FOR EVALUATION.  ADDITIONAL LAB WORK ORDERED AS WELL AS AN MRCP AND MRI OF THE ABDOMEN.

## 2024-09-19 NOTE — PLAN
[FreeTextEntry1] : OPTHALMOLOGY AUDIOLOGY EVALUATION GYN, MAMMOGRAM AND COLONOSCOPY DECLINED AS WELL AS SLEEP APNEA EVAL - PURPOSE REVIEWED WILL CONSIDER GOING FOR BONE DENSITY VISION SCREENING SAFETY / HEALTHY HABITS REVIEWED HEALTHY DIET AND LIFESTYLE MODIFICATIONS VACCINATIONS DISCUSSED: ALL DECLINED CHECK ROUTINE LAB WORK FOLLOW-UP ALL SPECIALISTS AS DIRECTED CALL WITH ANY QUESTIONS, CONCERNS OR CHANGES

## 2024-09-19 NOTE — HEALTH RISK ASSESSMENT
[Former] : Former [10-14] : 10-14 [> 15 Years] : > 15 Years [Retired] : retired [High School] : high school [] :  [# Of Children ___] : has [unfilled] children [Good] : ~his/her~ current health as good [Excellent] : ~his/her~  mood as  excellent [No] : In the past 12 months have you used drugs other than those required for medical reasons? No [No falls in past year] : Patient reported no falls in the past year [Little interest or pleasure doing things] : 1) Little interest or pleasure doing things [Feeling down, depressed, or hopeless] : 2) Feeling down, depressed, or hopeless [0] : 2) Feeling down, depressed, or hopeless: Not at all (0) [PHQ-2 Negative - No further assessment needed] : PHQ-2 Negative - No further assessment needed [HIV test declined] : HIV test declined [Hepatitis C test declined] : Hepatitis C test declined [Sexually Active] : sexually active [Patient declined mammogram] : Patient declined mammogram [Patient declined PAP Smear] : Patient declined PAP Smear [Patient declined colonoscopy] : Patient declined colonoscopy [None] : None [With Family] : lives with family [# of Members in Household ___] :  household currently consist of [unfilled] member(s) [NO] : No [Feels Safe at Home] : Feels safe at home [Fully functional (bathing, dressing, toileting, transferring, walking, feeding)] : Fully functional (bathing, dressing, toileting, transferring, walking, feeding) [Fully functional (using the telephone, shopping, preparing meals, housekeeping, doing laundry, using] : Fully functional and needs no help or supervision to perform IADLs (using the telephone, shopping, preparing meals, housekeeping, doing laundry, using transportation, managing medications and managing finances) [Reports changes in hearing] : Reports changes in hearing [Reports changes in vision] : Reports changes in vision [Reports normal functional visual acuity (ie: able to read med bottle)] : Reports normal functional visual acuity [Smoke Detector] : smoke detector [Carbon Monoxide Detector] : carbon monoxide detector [Safety elements used in home] : safety elements used in home [Seat Belt] :  uses seat belt [Sunscreen] : uses sunscreen [With Patient/Caregiver] : , with patient/caregiver [de-identified] : walking every other day [de-identified] : trying to eat healthy, avoids sweets,  lean protein.  water.   [[id number]] : 0 [Change in mental status noted] : No change in mental status noted [Language] : denies difficulty with language [Learning/Retaining New Information] : denies difficulty learning/retaining new information [Handling Complex Tasks] : denies difficulty handling complex tasks [High Risk Behavior] : no high risk behavior [Reports changes in dental health] : Reports no changes in dental health [Travel to Developing Areas] : does not  travel to developing areas [TB Exposure] : is not being exposed to tuberculosis [Caregiver Concerns] : does not have caregiver concerns [de-identified] : reading glasses, glasses for driving at night [de-identified] : just saw dentist and had implant [AdvancecareDate] : 09/24

## 2024-10-11 ENCOUNTER — APPOINTMENT (OUTPATIENT)
Dept: UROGYNECOLOGY | Facility: CLINIC | Age: 74
End: 2024-10-11

## 2024-10-11 VITALS
HEART RATE: 61 BPM | WEIGHT: 167 LBS | SYSTOLIC BLOOD PRESSURE: 124 MMHG | BODY MASS INDEX: 31.53 KG/M2 | DIASTOLIC BLOOD PRESSURE: 71 MMHG | RESPIRATION RATE: 14 BRPM | HEIGHT: 61 IN | TEMPERATURE: 97.7 F

## 2024-10-11 DIAGNOSIS — N95.2 POSTMENOPAUSAL ATROPHIC VAGINITIS: ICD-10-CM

## 2024-10-11 DIAGNOSIS — N89.8 OTHER SPECIFIED NONINFLAMMATORY DISORDERS OF VAGINA: ICD-10-CM

## 2024-10-11 DIAGNOSIS — N81.2 INCOMPLETE UTEROVAGINAL PROLAPSE: ICD-10-CM

## 2024-10-11 PROCEDURE — A4562: CPT

## 2024-10-11 PROCEDURE — 57160 INSERT PESSARY/OTHER DEVICE: CPT

## 2024-11-18 ENCOUNTER — APPOINTMENT (OUTPATIENT)
Dept: SURGERY | Facility: CLINIC | Age: 74
End: 2024-11-18

## 2025-03-24 ENCOUNTER — RX RENEWAL (OUTPATIENT)
Age: 75
End: 2025-03-24

## 2025-04-11 ENCOUNTER — APPOINTMENT (OUTPATIENT)
Dept: UROGYNECOLOGY | Facility: CLINIC | Age: 75
End: 2025-04-11

## 2025-04-11 VITALS — SYSTOLIC BLOOD PRESSURE: 148 MMHG | DIASTOLIC BLOOD PRESSURE: 73 MMHG

## 2025-04-11 DIAGNOSIS — N95.2 POSTMENOPAUSAL ATROPHIC VAGINITIS: ICD-10-CM

## 2025-04-11 DIAGNOSIS — N89.8 OTHER SPECIFIED NONINFLAMMATORY DISORDERS OF VAGINA: ICD-10-CM

## 2025-04-11 DIAGNOSIS — N81.2 INCOMPLETE UTEROVAGINAL PROLAPSE: ICD-10-CM

## 2025-04-11 DIAGNOSIS — N95.0 POSTMENOPAUSAL BLEEDING: ICD-10-CM

## 2025-04-11 PROCEDURE — 99213 OFFICE O/P EST LOW 20 MIN: CPT

## 2025-07-01 ENCOUNTER — RX RENEWAL (OUTPATIENT)
Age: 75
End: 2025-07-01

## 2025-07-07 NOTE — ED PROVIDER NOTE - PR
Ronan reports doing well on zepbound and is requesting dose increase.  Will send next dose to [place] [facility] [address] at her request.  
138